# Patient Record
Sex: FEMALE | Race: BLACK OR AFRICAN AMERICAN | NOT HISPANIC OR LATINO | Employment: FULL TIME | URBAN - METROPOLITAN AREA
[De-identification: names, ages, dates, MRNs, and addresses within clinical notes are randomized per-mention and may not be internally consistent; named-entity substitution may affect disease eponyms.]

---

## 2024-02-21 ENCOUNTER — OFFICE VISIT (OUTPATIENT)
Dept: OBSTETRICS AND GYNECOLOGY | Facility: CLINIC | Age: 34
End: 2024-02-21
Payer: MEDICAID

## 2024-02-21 VITALS
WEIGHT: 157 LBS | SYSTOLIC BLOOD PRESSURE: 118 MMHG | BODY MASS INDEX: 27.82 KG/M2 | HEIGHT: 63 IN | DIASTOLIC BLOOD PRESSURE: 76 MMHG

## 2024-02-21 DIAGNOSIS — Z30.09 FAMILY PLANNING: ICD-10-CM

## 2024-02-21 DIAGNOSIS — O03.9 SPONTANEOUS MISCARRIAGE: Primary | ICD-10-CM

## 2024-02-21 DIAGNOSIS — N72 CERVICITIS AND ENDOCERVICITIS: ICD-10-CM

## 2024-02-21 DIAGNOSIS — N92.6 IRREGULAR BLEEDING: ICD-10-CM

## 2024-02-21 DIAGNOSIS — Z11.3 SCREEN FOR SEXUALLY TRANSMITTED DISEASES: ICD-10-CM

## 2024-02-21 DIAGNOSIS — N76.0 BACTERIAL VAGINOSIS: ICD-10-CM

## 2024-02-21 DIAGNOSIS — Z12.4 SCREENING FOR MALIGNANT NEOPLASM OF CERVIX: ICD-10-CM

## 2024-02-21 DIAGNOSIS — N89.8 VAGINAL DISCHARGE: ICD-10-CM

## 2024-02-21 DIAGNOSIS — B96.89 BACTERIAL VAGINOSIS: ICD-10-CM

## 2024-02-21 LAB
B-HCG UR QL: NEGATIVE
EXPIRATION DATE: NORMAL
INTERNAL NEGATIVE CONTROL: NORMAL
INTERNAL POSITIVE CONTROL: NORMAL
Lab: NORMAL

## 2024-02-21 RX ORDER — METRONIDAZOLE 500 MG/1
500 TABLET ORAL 2 TIMES DAILY
Qty: 14 TABLET | Refills: 0 | Status: SHIPPED | OUTPATIENT
Start: 2024-02-21 | End: 2024-02-28

## 2024-02-21 RX ORDER — VITAMIN A, VITAMIN C, VITAMIN D-3, VITAMIN E, VITAMIN B-1, VITAMIN B-2, NIACIN, VITAMIN B-6, CALCIUM, IRON, ZINC, COPPER 4000; 120; 400; 22; 1.84; 3; 20; 10; 1; 12; 200; 27; 25; 2 [IU]/1; MG/1; [IU]/1; MG/1; MG/1; MG/1; MG/1; MG/1; MG/1; UG/1; MG/1; MG/1; MG/1; MG/1
1 TABLET ORAL DAILY
COMMUNITY
Start: 2024-01-23 | End: 2024-02-21 | Stop reason: SDUPTHER

## 2024-02-21 RX ORDER — DOXYCYCLINE HYCLATE 100 MG/1
100 CAPSULE ORAL 2 TIMES DAILY
Qty: 28 CAPSULE | Refills: 0 | Status: SHIPPED | OUTPATIENT
Start: 2024-02-21 | End: 2024-03-06

## 2024-02-21 RX ORDER — VITAMIN A, VITAMIN C, VITAMIN D-3, VITAMIN E, VITAMIN B-1, VITAMIN B-2, NIACIN, VITAMIN B-6, CALCIUM, IRON, ZINC, COPPER 4000; 120; 400; 22; 1.84; 3; 20; 10; 1; 12; 200; 27; 25; 2 [IU]/1; MG/1; [IU]/1; MG/1; MG/1; MG/1; MG/1; MG/1; MG/1; UG/1; MG/1; MG/1; MG/1; MG/1
1 TABLET ORAL DAILY
Qty: 90 TABLET | Refills: 3 | Status: SHIPPED | OUTPATIENT
Start: 2024-02-21 | End: 2025-02-20

## 2024-02-21 NOTE — PROGRESS NOTES
Chief Complaint  Gynecologic Exam (Patient is here to consult miscarriage() still has dark spotting, would like std screening & would like to consult about future pregnancies )    Subjective        Anila Sepulveda presents to [facility]  History of Present Illness  Patient is here for follow-up after recent miscarriage.  She was seen at [facility] clinic in 2024 with findings of a spontaneous miscarriage.  She reports that she has had continued light bleeding off and on since that time.  She says the bleeding is bright red in color and scant in amount.  She also reports the presence of an occasionally white or yellowish discharge.  She reports lower abdominal cramping and discomfort.  She denies fevers and chills.  Patient does desire to conceive again in the near future.  She has a history of 3 prior vaginal deliveries at term without complication.  She has also had 3 elective pregnancy terminations.  Her recent miscarriage was her only spontaneous pregnancy loss.    Menstrual History:  OB History          7    Para        Term                AB   4    Living   3         SAB   1    IAB   3    Ectopic        Molar        Multiple        Live Births   3                 No LMP recorded (lmp unknown).     History reviewed. No pertinent past medical history.    History reviewed. No pertinent surgical history.    Social History     Tobacco Use    Smoking status: Never    Smokeless tobacco: Never   Vaping Use    Vaping Use: Never used   Substance Use Topics    Alcohol use: Yes     Comment: socially    Drug use: Never     Family History   Problem Relation Age of Onset    Breast cancer Neg Hx     Ovarian cancer Neg Hx     Uterine cancer Neg Hx     Colon cancer Neg Hx      Current Outpatient Medications on File Prior to Visit   Medication Sig    [DISCONTINUED] Prenatal Vit-Fe Fumarate-FA (Prenatal Vitamin Plus Low Iron) 27-1 MG tablet Take 1 tablet by mouth  "Daily. (Patient not taking: Reported on 2/21/2024)     No current facility-administered medications on file prior to visit.     No Known Allergies    Review of systems:  Constitutional: No fevers, chills, sweats   Eye: No recent visual problems, denies blurry vision   HEENT: No ear pain, nasal congestion, sore throat, voice changes   Respiratory: No shortness of breath, cough, pain on breathing   Cardiovascular: No Chest pain, palpitations   Gastrointestinal: No nausea, vomiting, diarrhea, constipation   Genitourinary: No hematuria, dysuria, lesions on genitalia   Hema/Lymph: Negative for bruising, no edema   Endocrine: Negative for excessive thirst, excessive hunger, heat or cold intolerance   Musculoskeletal: No joint pain, muscle pain, decreased range of motion   Integumentary: No rash, pruritus, abrasions, lesions   Neurologic: No weakness, numbness, headaches   Psychiatric: No anxiety, depression, mood changes       Objective   Vital Signs:  /76   Ht 160 cm (62.99\")   Wt 71.2 kg (157 lb)   BMI 27.82 kg/m²   Estimated body mass index is 27.82 kg/m² as calculated from the following:    Height as of this encounter: 160 cm (62.99\").    Weight as of this encounter: 71.2 kg (157 lb).             Physical Exam     Gen: No acute distress, awake and oriented times three  Abdomen: soft, nontender, no masses or hernia, no hepatosplenomegaly, non distended, normoactive bowel sounds  Pelvic: Exam performed in the presence of a female chaperone  Patient has provided verbal consent to proceed with exam.  Normal external female genitalia, no lesions  Urethra: Normal meatus, no caruncle  Bladder: nontender  Vagina: There is a moderate amount of blood-tinged/yellow discharge noted, no lesions  Cervix: Tender to palpation, mildly friable, cervix is closed and firm  Uterus: Anteverted, normal size and shape, nontender  Adnexa: No masses or tenderness  External anal exam: Normal appearance, no lesions or " hemorrhoids  Rectal: Deferred  Psych: Good judgement and insight, normal affect and mood    Result Review :    The following data was reviewed by: Yossi Torres MD on 2024:    Data reviewed : Radiologic studies Pelvic/OB US , Consultant notes [facility] notes, and Labs    Office Visit on 2024   Component Date Value Ref Range Status    HCG, Urine, QL 2024 Negative  Negative Final    Lot Number [id number]   Final    Internal Positive Control 2024 Passed  Positive, Passed Final    Internal Negative Control 2024 Passed  Negative, Passed Final    Expiration Date 2024   Final       24  Component  Ref Range & Units 4 wk ago Comments   HCG QUANTITATIVE  mIU/mL 108.6      24  Component  Ref Range & Units 1 mo ago Comments   hCG Quant  0 - 4 mInt Units/mL ,204 High       ABORh O POS     Component 1 mo ago   C. TRACHOMATIS DNA NOT DETECTED     Component 1 mo ago   NEISSERIA GONORRHOEAE DNA NOT DETECTED     US (24):  IMPRESSION:   Intrauterine gestation measures 7 weeks 1 days by crown-rump length.  Fetal heart   beat is not visualized.  Findings are compatible with an early pregnancy failure.     Left ovarian corpus luteal cyst measuring 4.0 x 4.1 x 2.7 cm.       MD visit 24:  Assessment, Management and Plan:  Problem List Items Addressed This Visit    Complete spontaneous  without complication - Primary  Overview  24:  - LMP 10/28/2023  - ED visit 2024 with bleeding for 2 days. BSUS showed GS, no definitive YS. Thickened endometrium.  - Quant 1204 on 24  - Patient report that since her ED visit, she has passed tissue. Showed pics during clinic visit today.  - Bleeding is now minimal with light cramping.  - PNVs sent to pharmacy since pt wishes to get pregnant again. Advised to wait for one menstrual cycle before trying again.  - UPT in clinic faintly positive. Quant ordered.  - Advised she take a home UPT in one week.  -  Return precautions discussed           Assessment and Plan     Diagnoses and all orders for this visit:    1. Spontaneous miscarriage (Primary)  -     POC Pregnancy, Urine  -     HIV-1 / O / 2 Ag / Antibody  -     US Non-ob Transvaginal; Future    Findings appear to be consistent with complete spontaneous miscarriage.  Will check quantitative hCG today.  Office pregnancy test here is negative today.  Obtain ultrasound to evaluate endometrial lining.    2. Irregular bleeding  -     US Non-ob Transvaginal; Future    Overall suspect this may be infectious in etiology.  There are some mild cervical tenderness as well as bloody/purulent discharge.  We will start empiric treatment with metronidazole and doxycycline.  Follow-up in 1 week.  Obtain ultrasound to evaluate endometrial lining.    3. Screening for malignant neoplasm of cervix  -     IGP, Apt HPV,rfx 16 / 18,45    4. Screen for sexually transmitted diseases  -     IGP, Apt HPV,rfx 16 / 18,45  -     NuSwab VG+ - Swab, Vagina  -     Hepatitis B Surface Antigen  -     HCV Antibody Rfx To Qnt PCR  -     RPR, Rfx Qn RPR / Confirm TP  -     HCG, B-subunit, Quantitative    5. Vaginal discharge  -     NuSwab VG+ - Swab, Vagina    6. Family planning  -     Prenatal Vit-Fe Fumarate-FA (Prenatal Vitamin Plus Low Iron) 27-1 MG tablet; Take 1 tablet by mouth Daily.  Dispense: 90 tablet; Refill: 3    Discussed overview of early first trimester Ab. US findings d/w pt. Discussed expectations with patient. Discussed implications for future pregnancies. Explained that - overall - spontaneous Ab is quite common affecting 20-25% of all recognized pregnancies, with chromosomal anomalies accounting for roughly 90% of these. Explained no proven intervention to prevent miscarriage in this scenario, and explained lack of evidence of benefit of progesterone supplementation in next pregnancy. Explained that given history, pt should not be of greater risk than the average 34 year old woman.  Would recommend routine prenatal care next visit. Explained there is no benefit to delaying pregnancy following a miscarriage. Would recommend pelvic rest until bleeding stops and hcg returns to 0. Discussed need to follow hcg back to 0.       7. Bacterial vaginosis  -     metroNIDAZOLE (FLAGYL) 500 MG tablet; Take 1 tablet by mouth 2 (Two) Times a Day for 7 days.  Dispense: 14 tablet; Refill: 0  -     doxycycline (VIBRAMYCIN) 100 MG capsule; Take 1 capsule by mouth 2 (Two) Times a Day for 14 days.  Dispense: 28 capsule; Refill: 0    8. Cervicitis and endocervicitis  -     metroNIDAZOLE (FLAGYL) 500 MG tablet; Take 1 tablet by mouth 2 (Two) Times a Day for 7 days.  Dispense: 14 tablet; Refill: 0  -     doxycycline (VIBRAMYCIN) 100 MG capsule; Take 1 capsule by mouth 2 (Two) Times a Day for 14 days.  Dispense: 28 capsule; Refill: 0    Given exam findings, suspect there may be an underlying cervical/possibly mild uterine infection.  We will treat empirically with metronidazole and doxycycline today.  Follow-up in 1 week.         Follow Up     Return GYn US 1-4 weeks, GYn FU after US.  Patient was given instructions and counseling regarding her condition or for health maintenance advice. Please see specific information pulled into the AVS if appropriate.

## 2024-02-22 LAB
HBV SURFACE AG SERPL QL IA: NEGATIVE
HCG INTACT+B SERPL-ACNC: 43 MIU/ML
HCV AB SERPL QL IA: NORMAL
HCV IGG SERPL QL IA: NON REACTIVE
HIV 1+2 AB+HIV1 P24 AG SERPL QL IA: NON REACTIVE
RPR SER QL: NON REACTIVE

## 2024-02-24 LAB
A VAGINAE DNA VAG QL NAA+PROBE: NORMAL SCORE
BVAB2 DNA VAG QL NAA+PROBE: NORMAL SCORE
C ALBICANS DNA VAG QL NAA+PROBE: NEGATIVE
C GLABRATA DNA VAG QL NAA+PROBE: NEGATIVE
C TRACH DNA VAG QL NAA+PROBE: NEGATIVE
CYTOLOGIST CVX/VAG CYTO: NORMAL
CYTOLOGY CVX/VAG DOC CYTO: NORMAL
CYTOLOGY CVX/VAG DOC THIN PREP: NORMAL
DX ICD CODE: NORMAL
HIV 1 & 2 AB SER-IMP: NORMAL
HPV I/H RISK 4 DNA CVX QL PROBE+SIG AMP: NEGATIVE
MEGA1 DNA VAG QL NAA+PROBE: NORMAL SCORE
N GONORRHOEA DNA VAG QL NAA+PROBE: NEGATIVE
OTHER STN SPEC: NORMAL
STAT OF ADQ CVX/VAG CYTO-IMP: NORMAL
T VAGINALIS DNA VAG QL NAA+PROBE: NEGATIVE

## 2024-02-28 ENCOUNTER — OFFICE VISIT (OUTPATIENT)
Dept: OBSTETRICS AND GYNECOLOGY | Facility: CLINIC | Age: 34
End: 2024-02-28
Payer: MEDICAID

## 2024-02-28 VITALS
SYSTOLIC BLOOD PRESSURE: 130 MMHG | WEIGHT: 159 LBS | HEIGHT: 63 IN | DIASTOLIC BLOOD PRESSURE: 78 MMHG | BODY MASS INDEX: 28.17 KG/M2

## 2024-02-28 DIAGNOSIS — O03.9 SPONTANEOUS MISCARRIAGE: ICD-10-CM

## 2024-02-28 DIAGNOSIS — N94.89 ENDOMETRIAL MASS: Primary | ICD-10-CM

## 2024-02-28 RX ORDER — SCOLOPAMINE TRANSDERMAL SYSTEM 1 MG/1
1 PATCH, EXTENDED RELEASE TRANSDERMAL CONTINUOUS
OUTPATIENT
Start: 2024-02-28 | End: 2024-03-02

## 2024-02-28 RX ORDER — SODIUM CHLORIDE 0.9 % (FLUSH) 0.9 %
10 SYRINGE (ML) INJECTION AS NEEDED
OUTPATIENT
Start: 2024-02-28

## 2024-02-28 RX ORDER — SODIUM CHLORIDE 0.9 % (FLUSH) 0.9 %
10 SYRINGE (ML) INJECTION EVERY 12 HOURS SCHEDULED
OUTPATIENT
Start: 2024-02-28

## 2024-02-28 RX ORDER — SODIUM CHLORIDE, SODIUM LACTATE, POTASSIUM CHLORIDE, CALCIUM CHLORIDE 600; 310; 30; 20 MG/100ML; MG/100ML; MG/100ML; MG/100ML
125 INJECTION, SOLUTION INTRAVENOUS CONTINUOUS
OUTPATIENT
Start: 2024-02-28

## 2024-02-28 RX ORDER — ACETAMINOPHEN 500 MG
1000 TABLET ORAL ONCE
OUTPATIENT
Start: 2024-02-28 | End: 2024-02-28

## 2024-02-28 RX ORDER — PRENATAL VIT,CAL 76/IRON/FOLIC 29 MG-1 MG
1 TABLET ORAL DAILY
COMMUNITY
Start: 2024-02-21 | End: 2024-02-28 | Stop reason: SDUPTHER

## 2024-02-28 NOTE — PROGRESS NOTES
"Chief Complaint  Gynecologic Exam (Patient is here for f/u to US performed yesterday )    Subjective        Anila Sepulveda presents to [facility]  History of Present Illness  Patient is here today for follow-up of ultrasound that was performed yesterday in order to evaluate irregular bleeding.  Patient recently had spontaneous miscarriage.  She is not having any bleeding at this time.  Her quantitative hCG last week was 43.    The following portions of the patient's history were reviewed and updated as appropriate: allergies, current medications, past family history, past medical history, past social history, past surgical history, and problem list.    Objective   Vital Signs:  /78   Ht 160 cm (62.99\")   Wt 72.1 kg (159 lb)   BMI 28.17 kg/m²   Estimated body mass index is 28.17 kg/m² as calculated from the following:    Height as of this encounter: 160 cm (62.99\").    Weight as of this encounter: 72.1 kg (159 lb).             Physical Exam   General: No acute distress, awake and oriented x3  Psychiatric: good judgment and insight, normal mood  Neurological: cranial nerves II through XII intact, no deficits    Result Review :            Office Visit on 02/21/2024   Component Date Value Ref Range Status    HCG, Urine, QL 02/21/2024 Negative  Negative Final    Lot Number 02/21/2024 [id number]   Final    Internal Positive Control 02/21/2024 Passed  Positive, Passed Final    Internal Negative Control 02/21/2024 Passed  Negative, Passed Final    Expiration Date 02/21/2024 03-   Final    Diagnosis 02/21/2024 Comment   Final    NEGATIVE FOR INTRAEPITHELIAL LESION OR MALIGNANCY.    Specimen adequacy: 02/21/2024 Comment   Final    Satisfactory for evaluation. No endocervical component is identified.    Clinician Provided ICD-10: 02/21/2024 Comment   Final    Comment: Z12.4  Z11.3      Performed by: 02/21/2024 Comment   Final    Sheela Bartlett, Cytotechnologist (ASCP)    . 02/21/2024 .   Final "    Note: 02/21/2024 Comment   Final    Comment: The Pap smear is a screening test designed to aid in the detection of  premalignant and malignant conditions of the uterine cervix.  It is not a  diagnostic procedure and should not be used as the sole means of detecting  cervical cancer.  Both false-positive and false-negative reports do occur.      Method: 02/21/2024 Comment   Final    Comment: This liquid based ThinPrep(R) pap test was screened with the  use of an image guided system.      HPV Aptima 02/21/2024 Negative  Negative Final    Comment: This nucleic acid amplification test detects fourteen high-risk  HPV types (16,18,31,33,35,39,45,51,52,56,58,59,66,68) without  differentiation.      Atopobium Vaginae 02/21/2024 Moderate - 1  Score Final    BVAB 2 02/21/2024 Low - 0  Score Final    Megasphaera 1 02/21/2024 Low - 0  Score Final    Comment: Calculate total score by adding the 3 individual bacterial  vaginosis (BV) marker scores together.  Total score is  interpreted as follows:  Total score 0-1: Indicates the absence of BV.  Total score   2: Indeterminate for BV. Additional clinical                   data should be evaluated to establish a                   diagnosis.  Total score 3-6: Indicates the presence of BV.  This test was developed and its performance characteristics  determined by [employer].  It has not been cleared or approved  by the Food and Drug Administration.      Fatoumata Albicans, SUNSHINE 02/21/2024 Negative  Negative Final    Fatoumata Glabrata, SUNSHINE 02/21/2024 Negative  Negative Final    Trichomonas vaginosis 02/21/2024 Negative  Negative Final    Chlamydia trachomatis, SUNSHINE 02/21/2024 Negative  Negative Final    Neisseria gonorrhoeae, SUNSHINE 02/21/2024 Negative  Negative Final    HIV Screen 4th Gen w/RFX (Referenc* 02/21/2024 Non Reactive  Non Reactive Final    Comment: HIV Negative  HIV-1/HIV-2 antibodies and HIV-1 p24 antigen were NOT detected.  There is no laboratory evidence of HIV infection.       Hepatitis B Surface Ag 02/21/2024 Negative  Negative Final    Hepatitis C Ab 02/21/2024 Non Reactive  Non Reactive Final    RPR 02/21/2024 Non Reactive  Non Reactive Final    HCG Quantitative 02/21/2024 43  mIU/mL Final    Comment:                      Female (Non-pregnant)    0 -     5                              (Postmenopausal)  0 -     8                       Female (Pregnant)                       Weeks of Gestation                               3                6 -    71                               4               10 -   750                               5              797 -  5238                               6              939 - 75198                               7             7130 -[id number]                               8            [id number] -862136                               9            [id number] -[id number]                              10            [id number] -[id number]                              12            76438 -076605                              14            38155 - 42639                              15            23119 - 90415                              16             3587 - 32185                              17             7265 - 74991                              18             6470 - 40570  Roche E                           CLIA methodology      Interpretation 02/21/2024 Comment   Final    Comment: Not infected with HCV unless early or acute infection is  suspected (which may be delayed in an immunocompromised  individual), or other evidence exists to indicate HCV infection.           Ultrasound (2/27/24):  Findings:  Uterus measures 9.38 x 6.70 x 5.59 cm, volume 183.945 cm cube  There are no intramural or subserosal fibroids or masses identified.  Endometrial thickness measures 1.46 cm.  There is a vascular endometrial mass measuring 2.23 x 1.24 cm which likely represents an endometrial polyp.  Cervix is normal-appearing.    Left ovary: 2.42 x 1.98 x 2.19 cm, volume 5.494 cm cube  There is no adnexal  mass or cyst identified.    Right ovary: 2.75 x 1.83 x 2.55 cm, volume 6.719 cm cube  There is no adnexal mass or cyst identified.    There is no free fluid.    Impression:    2.2 cm endometrial mass which is likely consistent with a polyp.  Otherwise normal pelvic ultrasound         Assessment and Plan     Diagnoses and all orders for this visit:    1. Endometrial mass (Primary)  -     Case Request; Standing  -     Instruct Patient on Coughing, Deep Breathing and Incentive Spirometry; Future  -     sodium chloride 0.9 % flush 10 mL  -     sodium chloride 0.9 % flush 10 mL  -     lactated ringers infusion  -     acetaminophen (TYLENOL) tablet 1,000 mg  -     scopolamine patch 1 mg/72 hr  -     ceFAZolin (ANCEF) 2,000 mg in sodium chloride 0.9 % 100 mL IVPB  -     Case Request    2. Spontaneous miscarriage  -     HCG, B-subunit, Quantitative    Other orders  -     Code Status and Medical Interventions:; Standing  -     Follow Anesthesia Guidelines / Protocol; Future  -     Follow Anesthesia Guidelines / Protocol; Standing  -     Chlorhexidine Skin Prep; Future  -     Provide Patient With ERAS Hydration Instructions  -     Provide Patient With ERAS Booklet(s)/Handout  -     Place Sequential Compression Device; Standing  -     Prepare and Witness Surgical Consent Form; Standing  -     Verify NPO Status; Standing  -     Pregnancy, Urine - Urine, Clean Catch; Standing  -     Insert Peripheral IV; Standing  -     Saline Lock & Maintain IV Access; Standing    Recent lab and ultrasound results discussed with the patient.  We discussed significance of the ultrasound findings that appear to be suspicious for an endometrial polyp versus some other type of endometrial mass.  I would recommend hysteroscopy, dilation curettage, and MyoSure excision/polypectomy for both diagnostic and therapeutic purposes.  We discussed the differential diagnosis of the mass including most likely being a polyp versus fibroid, blood clot,  malignancy.  Explained Saúl would allow for diagnostic evaluation.  Additionally, this is likely the cause of her irregular bleeding and potentially may lead to complications in future pregnancies.  As the patient does desire to conceive in the near future, I would recommend removal of this mass.    The risk, benefits, alternatives to the surgery were discussed with the patient at length.  Surgical risk including the risk of bleeding, infection, scarring, weakening of the cervix, possibility of  labor/delivery, intrauterine adhesions, subfertility, infertility, uterine perforation, inadvertent injury to GI/ structures, anesthesia complications, etc. discussed with the patient at length.  All questions answered and she wishes to proceed with surgery.         Follow Up     Return after surgery.  Patient was given instructions and counseling regarding her condition or for health maintenance advice. Please see specific information pulled into the AVS if appropriate.

## 2024-02-29 LAB — HCG INTACT+B SERPL-ACNC: 21 MIU/ML

## 2024-03-08 ENCOUNTER — TELEPHONE (OUTPATIENT)
Dept: OBSTETRICS AND GYNECOLOGY | Facility: CLINIC | Age: 34
End: 2024-03-08
Payer: MEDICAID

## 2024-03-08 NOTE — TELEPHONE ENCOUNTER
Spoke with pt via  she is aware of procedure details. Provided pt with address to [place].     Mela Olivera

## 2024-03-12 NOTE — DISCHARGE INSTRUCTIONS
PAT call complete. Education provided to the patient on the following:    - Nothing to eat after midnight the night before your procedure, water and black coffee okay up to 2 hours before arrival time.  - If diabetic and procedure is after noon: No food 8 hours prior to arrival time, and only then only clear liquids 2 hours before arrival time.   - You will need to have someone drive you home after your procedure and remain with you for 24 hours after. The  will need to remain on site during your visit.  - Please remove all jewelry, including body piercing's, and leave any valuables at home. Only bring your drivers license and insurance card on day of procedure.  - Please arrive with a full bladder to provide a pregnancy test.   - Do not wear contact lenses; wear glasses and bring your case.  - Do not use any tobacco products on morning of procedure.  - Wash with antibacterial soap (such as Dial) the night before and morning of procedure.  - Be prepared to provide your last dose of all home medications.  - Coffee and vending available on the 1st and 5th floors; no cafeteria on site.  - You will need to arrive at 10:00am on Monday 3/18/24 at [facility] located at [address]. We are located on the 5th floor.  -Please be aware that arrival times may be subject to change up until the day of surgery.   - Feel free to contact us at: [phone] with any additional questions/concerns.  - Hold prenatal vitamin starting now.  - Do not have sex prior to your surgery and not until cleared by surgeon post surgery.

## 2024-03-12 NOTE — PRE-PROCEDURE INSTRUCTIONS
PAT call complete with  Natalia #[id number].   Education provided to the patient on the following:     - Nothing to eat after midnight the night before your procedure, water and black coffee okay up to 2 hours before arrival time.  - If diabetic and procedure is after noon: No food 8 hours prior to arrival time, and only then only clear liquids 2 hours before arrival time.   - You will need to have someone drive you home after your procedure and remain with you for 24 hours after. The  will need to remain on site during your visit.  - Please remove all jewelry, including body piercing's, and leave any valuables at home. Only bring your drivers license and insurance card on day of procedure.  - Please arrive with a full bladder to provide a pregnancy test.   - Do not wear contact lenses; wear glasses and bring your case.  - Do not use any tobacco products on morning of procedure.  - Wash with antibacterial soap (such as Dial) the night before and morning of procedure.  - Be prepared to provide your last dose of all home medications.  - Coffee and vending available on the 1st and 5th floors; no cafeteria on site.  - You will need to arrive at 10:00am on Monday 3/18/24 at [facility] located at [address]. We are located on the 5th floor.  -Please be aware that arrival times may be subject to change up until the day of surgery.   - Feel free to contact us at: [phone] with any additional questions/concerns.  - Hold prenatal vitamin starting now.  - Do not have sex prior to your surgery and not until cleared by surgeon post surgery.

## 2024-03-18 ENCOUNTER — HOSPITAL ENCOUNTER (OUTPATIENT)
Age: 34
Setting detail: HOSPITAL OUTPATIENT SURGERY
Discharge: HOME | End: 2024-03-18
Attending: OBSTETRICS & GYNECOLOGY | Admitting: OBSTETRICS & GYNECOLOGY
Payer: MEDICAID

## 2024-03-18 ENCOUNTER — ANESTHESIA EVENT (OUTPATIENT)
Age: 34
End: 2024-03-18
Payer: MEDICAID

## 2024-03-18 ENCOUNTER — ANESTHESIA (OUTPATIENT)
Age: 34
End: 2024-03-18
Payer: MEDICAID

## 2024-03-18 VITALS
OXYGEN SATURATION: 100 % | HEART RATE: 68 BPM | HEIGHT: 68 IN | DIASTOLIC BLOOD PRESSURE: 91 MMHG | RESPIRATION RATE: 16 BRPM | WEIGHT: 161.6 LBS | BODY MASS INDEX: 24.49 KG/M2 | TEMPERATURE: 98 F | SYSTOLIC BLOOD PRESSURE: 120 MMHG

## 2024-03-18 DIAGNOSIS — N94.89 ENDOMETRIAL MASS: ICD-10-CM

## 2024-03-18 LAB
B-HCG UR QL: POSITIVE
EXPIRATION DATE: ABNORMAL
INTERNAL NEGATIVE CONTROL: NEGATIVE
INTERNAL POSITIVE CONTROL: POSITIVE
Lab: ABNORMAL

## 2024-03-18 PROCEDURE — 81025 URINE PREGNANCY TEST: CPT | Performed by: STUDENT IN AN ORGANIZED HEALTH CARE EDUCATION/TRAINING PROGRAM

## 2024-03-18 RX ORDER — FAMOTIDINE 10 MG/ML
20 INJECTION, SOLUTION INTRAVENOUS ONCE
Status: DISCONTINUED | OUTPATIENT
Start: 2024-03-18 | End: 2024-03-18 | Stop reason: HOSPADM

## 2024-03-18 RX ORDER — SCOLOPAMINE TRANSDERMAL SYSTEM 1 MG/1
1 PATCH, EXTENDED RELEASE TRANSDERMAL CONTINUOUS
Status: DISCONTINUED | OUTPATIENT
Start: 2024-03-18 | End: 2024-03-18 | Stop reason: HOSPADM

## 2024-03-18 RX ORDER — ACETAMINOPHEN 500 MG
1000 TABLET ORAL ONCE
Status: DISCONTINUED | OUTPATIENT
Start: 2024-03-18 | End: 2024-03-18 | Stop reason: HOSPADM

## 2024-03-18 RX ORDER — SODIUM CHLORIDE, SODIUM LACTATE, POTASSIUM CHLORIDE, CALCIUM CHLORIDE 600; 310; 30; 20 MG/100ML; MG/100ML; MG/100ML; MG/100ML
125 INJECTION, SOLUTION INTRAVENOUS CONTINUOUS
Status: DISCONTINUED | OUTPATIENT
Start: 2024-03-18 | End: 2024-03-18 | Stop reason: HOSPADM

## 2024-03-18 RX ORDER — SODIUM CHLORIDE 0.9 % (FLUSH) 0.9 %
10 SYRINGE (ML) INJECTION EVERY 12 HOURS SCHEDULED
Status: DISCONTINUED | OUTPATIENT
Start: 2024-03-18 | End: 2024-03-18 | Stop reason: HOSPADM

## 2024-03-18 RX ORDER — SODIUM CHLORIDE 0.9 % (FLUSH) 0.9 %
10 SYRINGE (ML) INJECTION AS NEEDED
Status: DISCONTINUED | OUTPATIENT
Start: 2024-03-18 | End: 2024-03-18 | Stop reason: HOSPADM

## 2024-03-18 RX ORDER — MIDAZOLAM HYDROCHLORIDE 1 MG/ML
1 INJECTION INTRAMUSCULAR; INTRAVENOUS
Status: DISCONTINUED | OUTPATIENT
Start: 2024-03-18 | End: 2024-03-18 | Stop reason: HOSPADM

## 2024-03-18 RX ORDER — SODIUM CHLORIDE 0.9 % (FLUSH) 0.9 %
3-10 SYRINGE (ML) INJECTION AS NEEDED
Status: DISCONTINUED | OUTPATIENT
Start: 2024-03-18 | End: 2024-03-18 | Stop reason: HOSPADM

## 2024-03-18 RX ORDER — SODIUM CHLORIDE 0.9 % (FLUSH) 0.9 %
3 SYRINGE (ML) INJECTION EVERY 12 HOURS SCHEDULED
Status: DISCONTINUED | OUTPATIENT
Start: 2024-03-18 | End: 2024-03-18 | Stop reason: HOSPADM

## 2024-03-18 RX ORDER — FENTANYL CITRATE 50 UG/ML
50 INJECTION, SOLUTION INTRAMUSCULAR; INTRAVENOUS ONCE AS NEEDED
Status: DISCONTINUED | OUTPATIENT
Start: 2024-03-18 | End: 2024-03-18 | Stop reason: HOSPADM

## 2024-03-18 RX ORDER — SODIUM CHLORIDE, SODIUM LACTATE, POTASSIUM CHLORIDE, CALCIUM CHLORIDE 600; 310; 30; 20 MG/100ML; MG/100ML; MG/100ML; MG/100ML
9 INJECTION, SOLUTION INTRAVENOUS CONTINUOUS
Status: DISCONTINUED | OUTPATIENT
Start: 2024-03-18 | End: 2024-03-18 | Stop reason: HOSPADM

## 2024-03-18 NOTE — ANESTHESIA PREPROCEDURE EVALUATION
Anesthesia Evaluation     Patient summary reviewed and Nursing notes reviewed                Airway   Mallampati: II  TM distance: >3 FB  Neck ROM: full  Dental - normal exam     Pulmonary - negative pulmonary ROS   Cardiovascular - negative cardio ROS        Neuro/Psych- negative ROS  GI/Hepatic/Renal/Endo - negative ROS     Musculoskeletal     Abdominal    Substance History      OB/GYN          Other        ROS/Med Hx Other: 33 y.o. female status post spontaneous pregnancy loss with endometrial mass                    Anesthesia Plan    ASA 2     general     (I have reviewed the patient's history with the patient and the chart, including all pertinent laboratory results and imaging. I have explained the risks of anesthesia including but not limited to dental damage, corneal abrasion, nerve injury, MI, stroke, and death. Questions asked and answered. Anesthetic plan discussed with patient and team as indicated. Patient expressed understanding of the above.    Clear liquids at 0900)  intravenous induction     Anesthetic plan, risks, benefits, and alternatives have been provided, discussed and informed consent has been obtained with: patient.        CODE STATUS:

## 2024-03-18 NOTE — PROGRESS NOTES
"Chief Complaint  Gynecologic Exam (Patient is here to consult about US)    Subjective        Anila Sepulveda presents to [facility]  History of Present Illness  Patient is here for repeat evaluation of endometrial mass and slightly elevated hCG levels.  Patient had spontaneous miscarriage in January 2024.  hCG levels have declined to a shawn of 21 on 2/28; however, earlier this week just prior to her planned hysteroscopy hCG level had slightly increased on 3/15 to 23.  Patient had opted at that time to postpone the surgery in hopes of further workup.  As noted in my note at the hospital at the time, the patient has not been sexually active since November, and as such cannot have a new pregnancy.  She underwent ultrasound 2 days ago.  Findings are included below.  Repeat hCG on 3/18 was again 23.    Objective   Vital Signs:  /68   Ht 172.7 cm (67.99\")   Wt 72.9 kg (160 lb 12.8 oz)   BMI 24.46 kg/m²   Estimated body mass index is 24.46 kg/m² as calculated from the following:    Height as of this encounter: 172.7 cm (67.99\").    Weight as of this encounter: 72.9 kg (160 lb 12.8 oz).       BMI is within normal parameters. No other follow-up for BMI required.      Physical Exam   General: No acute distress, awake and oriented x3  Psychiatric: good judgment and insight, normal mood  Neurological: cranial nerves II through XII intact, no deficits    Result Review :    The following data was reviewed by: Yossi Torres MD on 03/20/2024:    Data reviewed : Radiologic studies US and Labs    Component  Ref Range & Units 2 d ago 5 d ago 3 wk ago 4 wk ago   HCG Quantitative  mIU/mL 23 23.70 CM 21 CM 43 CM       Ultrasound (3/18/24):  Findings:  Uterus measures 8.36 x 5.71 x 4.43 cm, volume 110.725 cm cube  There are no intramural or subserosal fibroids or masses identified.  Endometrial thickness measures 2.25 cm.  There is again noted to be an intrauterine endometrial mass which overall " appears similar to the recent ultrasound on 2/27.  The largest measurement today is 2.8 x 2.4 cm, which is slightly increased in size from the previous ultrasound; however, this could be related to interobserver error.  There is noted to be vascular flow to the periphery of the mass, similar in appearance to the previous ultrasound.  Cervix is normal-appearing.     Left ovary: 2.53 x 1.77 x 1.77 cm, volume 4.150 cm cube  There is no adnexal mass or cyst identified.     Right ovary: 2.24 x 1.10 x 1.14 cm, volume 1.471 cm cube  There is no adnexal mass or cyst identified.     There is no free fluid.     Impression:     There is again noted to be an intrauterine endometrial mass, today measuring 2.8 x 2.4 cm.  Differential diagnosis would include endometrial polyp, submucosal fibroid, retained products of conception, gestational trophoblastic disease/partial molar pregnancy, uterine malignancy.  No intra or extrauterine gestation is identified.  Otherwise normal ultrasound.         Assessment and Plan     Diagnoses and all orders for this visit:    1. Elevated serum hCG in female, not pregnant (Primary)  -     Case Request; Standing  -     Instruct Patient on Coughing, Deep Breathing and Incentive Spirometry; Future  -     sodium chloride 0.9 % flush 10 mL  -     sodium chloride 0.9 % flush 10 mL  -     lactated ringers infusion  -     acetaminophen (TYLENOL) tablet 1,000 mg  -     scopolamine patch 1 mg/72 hr  -     ceFAZolin (ANCEF) 2,000 mg in sodium chloride 0.9 % 100 mL IVPB  -     Case Request    2. Endometrial mass  -     Case Request; Standing  -     Instruct Patient on Coughing, Deep Breathing and Incentive Spirometry; Future  -     sodium chloride 0.9 % flush 10 mL  -     sodium chloride 0.9 % flush 10 mL  -     lactated ringers infusion  -     acetaminophen (TYLENOL) tablet 1,000 mg  -     scopolamine patch 1 mg/72 hr  -     ceFAZolin (ANCEF) 2,000 mg in sodium chloride 0.9 % 100 mL IVPB  -     Case  Request    Other orders  -     Code Status and Medical Interventions:; Standing  -     Follow Anesthesia Guidelines / Protocol; Future  -     Follow Anesthesia Guidelines / Protocol; Standing  -     Provide Patient With ERAS Hydration Instructions  -     Provide Patient With ERAS Booklet(s)/Handout  -     Obtain Informed Consent; Standing  -     Verify NPO Status; Standing  -     Place Sequential Compression Device; Standing  -     Verify / Perform Chlorhexidine Skin Prep; Standing  -     Verify NPO Status; Standing  -     Pregnancy, Urine - Urine, Clean Catch; Standing  -     Insert Peripheral IV; Standing  -     Saline Lock & Maintain IV Access; Standing    Patient with plateauing hCG levels following a recent miscarriage.  She also has ultrasound that shows endometrial mass measuring upwards of 2.8 cm.  I have discussed the situation with the patient at length.  I explained the differential diagnosis of the mass to include retained products of conception, gestational trophoblastic disease/partial mole, uterine malignancy, endometrial polyp, submucosal fibroid, etc.  The fact that the patient still having persistent hCG elevations does raise the concern of retained products of conception or, less likely, hCG releasing malignancy.  As such, I feel it is imperative that we obtain tissue diagnosis from this endometrial mass.  Again, the best way to do this would be with hysteroscopy, dilation curettage, and likely MyoSure.    I have explained in no uncertain terms that at this time there is no evidence of a viable pregnancy.  Patient had a miscarriage in January.  At that time there was a documented 7-week fetus with no fetal heart tones.  This is clearly no longer seen.  Her hCG had fallen from about 1200 then now to a shawn of 21.  hCG did not increase at all any from 3/15 to 3/18, thus we can be confident that there is not a new viable pregnancy.  Furthermore, the patient notes that she has not been sexually  active at all since 11/2023.    At this time I recommend resuming plans for hysteroscopy, dilation curettage, excision of endometrial mass.  Risk, benefits, alternatives to the surgery were again discussed with the patient.  Preoperative process and typical recovery were discussed.  All questions answered and she agrees with plan to proceed with surgery at this time.    Surgery scheduled for 3/28 at 3:30 PM         Follow Up     No follow-ups on file.  Patient was given instructions and counseling regarding her condition or for health maintenance advice. Please see specific information pulled into the AVS if appropriate.

## 2024-03-18 NOTE — PROGRESS NOTES
Entirety of today's encounter including patient interview, exam, and counseling performed with the aid of a medical [facility]  via the telephone.       As noted in my H&P, patient had plateauing hCG level.  Patient reports that she has not had sex at all since November 2023.  Her partner has been abroad and she has not been sexually active at all.    In that interim, she had a miscarriage in January 2024.  Her hCG level that we can document was as high as 1200.  Gradually had dropped down to a shawn of 21 on 2/28/24.  In preparation for her surgery today, she had labs done last week on 3/15, which showed an hCG of 23.    Had the patient been sexually active within the last 2-3 weeks, there is at least the remote possibility that she may have had complete resolution of her hCG level followed by a new ovulation, followed by conception; however, the patient states that she has not been sexually active at all for at least the last 4 months.  I have explained to the patient that it would not be medically possible for her to have a new pregnancy in the absence of sexual activity.  I explained my concerns that her plateauing hCG may be related to either retained products of conception or, gestational trophoblastic disease such as a partial mole, or less likely, a malignancy releasing hCG.  I explained that the only way to definitively diagnose this would be with surgical evacuation of the uterine contents and laboratory/genetic evaluation.    The patient states that she and her family have been praying for miracle and that the uterine mass seen on ultrasound last month could be developing into a new pregnancy.  I explained that physiologically there would be no basis for this.    Given the patient's concerns and believes, I do think it would be reasonable to repeat blood work at this time, repeat the ultrasound at this time, and monitor the situation closely.  Should she have rising hCG levels, we would need to  follow them serially very closely until they either reach the discriminatory zone where we would expect to see an intrauterine pregnancy or if they plateau off again or decrease, proceeding with surgery at that time.    I did explain that the practice of close observation with serial labs and ultrasound may delay the diagnosis and treatment of a potentially serious condition such as a cancer.  The patient verbalizes understanding, but she wishes to cancel surgery today.    We will have her go to the office today for lab work and ultrasound.  I will see her in the office in 2 days for review of these results and for further planning.    I also explained all of the above to the patient's friend who has accompanied her today.  They have no further questions.

## 2024-03-18 NOTE — H&P
[facility]   HISTORY AND PHYSICAL    Patient Name: Anila Sepulveda  : 1990  MRN: [id number]  Primary Care Physician:  Frannie Vanegas APRN  Date of admission: (Not on file)    Subjective   Subjective     Chief Complaint: Surgery    HPI:    Anila Sepulveda is a 33 y.o. female patient presents today for scheduled surgical hysteroscopy, dilation curettage, and excision of uterine mass/polyp.  Patient recently had a spontaneous miscarriage.  Upon workup for this with ultrasound was noted to have a intrauterine mass which likely represent an endometrial polyp but with broad differential.  They recommended removal of the mass for both diagnostic and therapeutic purposes.  She does report a history of irregular menses.    She is in her usual state of health at this time.    Review of Systems     Constitutional: No fevers, chills, sweats   Eye: No recent visual problems, denies blurry vision   HEENT: No ear pain, nasal congestion, sore throat, voice changes   Respiratory: No shortness of breath, cough, pain on breathing   Cardiovascular: No Chest pain, palpitations   Gastrointestinal: No nausea, vomiting, diarrhea, constipation   Genitourinary: No hematuria, dysuria, lesions on genitalia   Hema/Lymph: Negative for bruising, no edema   Endocrine: Negative for excessive thirst, excessive hunger, heat or cold intolerance   Musculoskeletal: No joint pain, muscle pain, decreased range of motion   Integumentary: No rash, pruritus, abrasions, lesions   Neurologic: No weakness, numbness, headaches   Psychiatric: No anxiety, depression, mood changes         Personal History     Past Medical History:   Diagnosis Date    Irregular uterine bleeding     Spontaneous miscarriage 2024       History reviewed. No pertinent surgical history.    Family History: family history is not on file. Otherwise pertinent FHx was reviewed and not pertinent to current issue.    Social History:  reports that she has never smoked. She  has never used smokeless tobacco. She reports current alcohol use. She reports that she does not use drugs.    Home Medications:  Prenatal Vitamin Plus Low Iron      Allergies:  No Known Allergies    Objective   Objective     Vitals:      Physical Exam     General: No acute distress, awake and oriented x3   HEENT: Normocephalic atraumatic, moist mucous membranes   Eyes: Pupils equal round reactive to light and accommodation, extraocular muscles intact   Respiratory: Normal work of breathing, good air movement   Extremities: No calf tenderness, no lower extremity edema   Psychiatric: Good judgment and insight, normal affect and mood   Neurologic: Cranial nerves II through XII intact, no deficits   Skin: No rashes or lesions       Result Review    Result Review:  I have personally reviewed the results from the time of this admission to 3/18/2024 08:59 EDT and agree with these findings:  [x]  Laboratory list / accordion  []  Microbiology  [x]  Radiology  []  EKG/Telemetry   []  Cardiology/Vascular   []  Pathology  []  Old records  []  Other:  Most notable findings include:           Component  Ref Range & Units 3 d ago 2 wk ago 3 wk ago   HCG Quantitative  mIU/mL 23.70 21 CM 43 CM        1/23/24  Component  Ref Range & Units 4 wk ago Comments   HCG QUANTITATIVE  mIU/mL 108.6        1/7/24  Component  Ref Range & Units 1 mo ago Comments   hCG Quant  0 - 4 mInt Units/mL 1,204 High         ABORh O POS       Ultrasound (2/27/24):  Findings:  Uterus measures 9.38 x 6.70 x 5.59 cm, volume 183.945 cm cube  There are no intramural or subserosal fibroids or masses identified.  Endometrial thickness measures 1.46 cm.  There is a vascular endometrial mass measuring 2.23 x 1.24 cm which likely represents an endometrial polyp.  Cervix is normal-appearing.     Left ovary: 2.42 x 1.98 x 2.19 cm, volume 5.494 cm cube  There is no adnexal mass or cyst identified.     Right ovary: 2.75 x 1.83 x 2.55 cm, volume 6.719 cm cube  There is no  adnexal mass or cyst identified.     There is no free fluid.     Impression:     2.2 cm endometrial mass which is likely consistent with a polyp.  Otherwise normal pelvic ultrasound      Assessment & Plan   Assessment / Plan     Brief Patient Summary:  Anila Sepulveda is a 33 y.o. female status post spontaneous pregnancy loss with endometrial mass    Active Hospital Problems:  Active Hospital Problems    Diagnosis     **Endometrial mass      Plan:   1.  Patient has had recent spontaneous miscarriage.  Now has plateauing hCG levels around 20.  Recent imaging suggested a roughly 2 cm intrauterine mass of unclear origin.  Differential diagnosis would include retained products of conception, clot, polyp, fibroid, partial mole, malignancy, etc. There is clearly no viable pregnancy giving falling hcg levels.   We discussed significance of the ultrasound findings that appear to be suspicious for an endometrial polyp versus some other type of endometrial mass.  I would recommend hysteroscopy, dilation curettage, and MyoSure excision/polypectomy for both diagnostic and therapeutic purposes.   Explained Myosure would allow for diagnostic evaluation.  Additionally, this is likely the cause of her irregular bleeding and potentially may lead to complications in future pregnancies.  As the patient does desire to conceive in the near future, I would recommend removal of this mass.   The risk, benefits, alternatives to the surgery were discussed with the patient at length.  Surgical risk including the risk of bleeding, infection, scarring, weakening of the cervix, possibility of  labor/delivery, intrauterine adhesions, subfertility, infertility, uterine perforation, inadvertent injury to GI/ structures, anesthesia complications, etc. discussed with the patient at length.  All questions answered and she wishes to proceed with surgery.  2. Routine preop antibiotics with cefazolin 2 grams.    DVT prophylaxis:  SCDs      CODE  STATUS:  Full code     Admission Status:  I believe this patient meets outpatient status.    Yossi Torres MD

## 2024-03-20 ENCOUNTER — OFFICE VISIT (OUTPATIENT)
Dept: OBSTETRICS AND GYNECOLOGY | Facility: CLINIC | Age: 34
End: 2024-03-20
Payer: MEDICAID

## 2024-03-20 VITALS
SYSTOLIC BLOOD PRESSURE: 102 MMHG | WEIGHT: 160.8 LBS | HEIGHT: 68 IN | BODY MASS INDEX: 24.37 KG/M2 | DIASTOLIC BLOOD PRESSURE: 68 MMHG

## 2024-03-20 DIAGNOSIS — N94.89 ENDOMETRIAL MASS: ICD-10-CM

## 2024-03-20 DIAGNOSIS — R79.89 ELEVATED SERUM HCG IN FEMALE, NOT PREGNANT: Primary | ICD-10-CM

## 2024-03-20 RX ORDER — SODIUM CHLORIDE 0.9 % (FLUSH) 0.9 %
10 SYRINGE (ML) INJECTION EVERY 12 HOURS SCHEDULED
OUTPATIENT
Start: 2024-03-20

## 2024-03-20 RX ORDER — SODIUM CHLORIDE, SODIUM LACTATE, POTASSIUM CHLORIDE, CALCIUM CHLORIDE 600; 310; 30; 20 MG/100ML; MG/100ML; MG/100ML; MG/100ML
125 INJECTION, SOLUTION INTRAVENOUS CONTINUOUS
OUTPATIENT
Start: 2024-03-20

## 2024-03-20 RX ORDER — SCOLOPAMINE TRANSDERMAL SYSTEM 1 MG/1
1 PATCH, EXTENDED RELEASE TRANSDERMAL CONTINUOUS
OUTPATIENT
Start: 2024-03-20 | End: 2024-03-23

## 2024-03-20 RX ORDER — ACETAMINOPHEN 500 MG
1000 TABLET ORAL ONCE
OUTPATIENT
Start: 2024-03-20 | End: 2024-03-20

## 2024-03-20 RX ORDER — SODIUM CHLORIDE 0.9 % (FLUSH) 0.9 %
10 SYRINGE (ML) INJECTION AS NEEDED
OUTPATIENT
Start: 2024-03-20

## 2024-03-22 NOTE — PRE-PROCEDURE INSTRUCTIONS
PAT call complete. Education provided to the patient on the following: Matthew  # [id number]     - Nothing to eat after midnight the night before your procedure, water and black coffee okay up to 2 hours before arrival time.  - If diabetic and procedure is after noon: No food 8 hours prior to arrival time, and only then only clear liquids 2 hours before arrival time.   - You will need to have someone drive you home after your procedure and remain with you for 24 hours after. The  will need to remain on site during your visit.  - Please remove all jewelry, including body piercing's, and leave any valuables at home. Only bring your drivers license and insurance card on day of procedure.  - Please arrive with a full bladder to provide a pregnancy test.   - Do not wear contact lenses; wear glasses and bring your case.  - Wash with antibacterial soap (such as Dial) the night before and morning of procedure.  - Be prepared to provide your last dose of all home medications.  - Coffee and vending available on the 1st and 5th floors; no cafeteria on site.  - You will need to arrive at 3/28/24 on 1430 at [facility] located at [address]. We are located on the 5th floor.  -Please be aware that arrival times may be subject to change up until the day of surgery.   - Feel free to contact us at: [phone] with any additional questions/concerns.     Instructed to hold pre-keyla vitamin. Verbalized understanding. Patient states her friends Kimberly and Deana will bring her and take her home.

## 2024-03-25 ENCOUNTER — TELEPHONE (OUTPATIENT)
Dept: OBSTETRICS AND GYNECOLOGY | Facility: CLINIC | Age: 34
End: 2024-03-25
Payer: MEDICAID

## 2024-03-25 NOTE — TELEPHONE ENCOUNTER
Spoke with pt via  she is aware of new arrival time of 12:30 pm for her surgery that was moved up to 1:30 pm on 3-28.     Mela Olivera

## 2024-03-27 ENCOUNTER — ANESTHESIA EVENT (OUTPATIENT)
Age: 34
End: 2024-03-27
Payer: MEDICAID

## 2024-03-28 ENCOUNTER — HOSPITAL ENCOUNTER (OUTPATIENT)
Age: 34
Setting detail: HOSPITAL OUTPATIENT SURGERY
Discharge: HOME | End: 2024-03-28
Attending: OBSTETRICS & GYNECOLOGY | Admitting: OBSTETRICS & GYNECOLOGY
Payer: MEDICAID

## 2024-03-28 ENCOUNTER — ANESTHESIA (OUTPATIENT)
Age: 34
End: 2024-03-28
Payer: MEDICAID

## 2024-03-28 VITALS
HEIGHT: 68 IN | BODY MASS INDEX: 25.01 KG/M2 | TEMPERATURE: 97.1 F | WEIGHT: 165 LBS | SYSTOLIC BLOOD PRESSURE: 148 MMHG | DIASTOLIC BLOOD PRESSURE: 95 MMHG | RESPIRATION RATE: 12 BRPM | OXYGEN SATURATION: 100 % | HEART RATE: 76 BPM

## 2024-03-28 DIAGNOSIS — N94.89 ENDOMETRIAL MASS: ICD-10-CM

## 2024-03-28 DIAGNOSIS — G89.18 POSTOPERATIVE PAIN: Primary | ICD-10-CM

## 2024-03-28 DIAGNOSIS — R79.89 ELEVATED SERUM HCG IN FEMALE, NOT PREGNANT: ICD-10-CM

## 2024-03-28 LAB
B-HCG UR QL: NEGATIVE
EXPIRATION DATE: NORMAL
INTERNAL NEGATIVE CONTROL: NEGATIVE
INTERNAL POSITIVE CONTROL: POSITIVE
Lab: NORMAL

## 2024-03-28 PROCEDURE — 88305 TISSUE EXAM BY PATHOLOGIST: CPT | Performed by: OBSTETRICS & GYNECOLOGY

## 2024-03-28 PROCEDURE — 25010000002 LIDOCAINE 1 % SOLUTION: Performed by: ANESTHESIOLOGY

## 2024-03-28 PROCEDURE — 25010000002 ONDANSETRON PER 1 MG: Performed by: ANESTHESIOLOGY

## 2024-03-28 PROCEDURE — C1782 MORCELLATOR: HCPCS | Performed by: OBSTETRICS & GYNECOLOGY

## 2024-03-28 PROCEDURE — 25010000002 CEFAZOLIN PER 500 MG: Performed by: OBSTETRICS & GYNECOLOGY

## 2024-03-28 PROCEDURE — 25010000002 KETOROLAC TROMETHAMINE PER 15 MG: Performed by: ANESTHESIOLOGY

## 2024-03-28 PROCEDURE — 25010000002 FENTANYL CITRATE (PF) 50 MCG/ML SOLUTION: Performed by: ANESTHESIOLOGY

## 2024-03-28 PROCEDURE — 25010000002 HYDROMORPHONE PER 4 MG: Performed by: ANESTHESIOLOGY

## 2024-03-28 PROCEDURE — 58558 HYSTEROSCOPY BIOPSY: CPT | Performed by: OBSTETRICS & GYNECOLOGY

## 2024-03-28 PROCEDURE — 25810000003 LACTATED RINGERS PER 1000 ML: Performed by: STUDENT IN AN ORGANIZED HEALTH CARE EDUCATION/TRAINING PROGRAM

## 2024-03-28 PROCEDURE — 25010000002 PROPOFOL 1000 MG/100ML EMULSION: Performed by: ANESTHESIOLOGY

## 2024-03-28 PROCEDURE — 81025 URINE PREGNANCY TEST: CPT | Performed by: STUDENT IN AN ORGANIZED HEALTH CARE EDUCATION/TRAINING PROGRAM

## 2024-03-28 PROCEDURE — 25010000002 DEXAMETHASONE PER 1 MG: Performed by: ANESTHESIOLOGY

## 2024-03-28 RX ORDER — SODIUM CHLORIDE 0.9 % (FLUSH) 0.9 %
3 SYRINGE (ML) INJECTION EVERY 12 HOURS SCHEDULED
Status: DISCONTINUED | OUTPATIENT
Start: 2024-03-28 | End: 2024-03-28 | Stop reason: HOSPADM

## 2024-03-28 RX ORDER — LABETALOL HYDROCHLORIDE 5 MG/ML
5 INJECTION, SOLUTION INTRAVENOUS
Status: DISCONTINUED | OUTPATIENT
Start: 2024-03-28 | End: 2024-03-28 | Stop reason: HOSPADM

## 2024-03-28 RX ORDER — TRAMADOL HYDROCHLORIDE 50 MG/1
50 TABLET ORAL EVERY 6 HOURS PRN
Status: DISCONTINUED | OUTPATIENT
Start: 2024-03-28 | End: 2024-03-28 | Stop reason: HOSPADM

## 2024-03-28 RX ORDER — PROMETHAZINE HYDROCHLORIDE 12.5 MG/1
25 TABLET ORAL ONCE AS NEEDED
Status: DISCONTINUED | OUTPATIENT
Start: 2024-03-28 | End: 2024-03-28 | Stop reason: HOSPADM

## 2024-03-28 RX ORDER — MIDAZOLAM HYDROCHLORIDE 1 MG/ML
1 INJECTION INTRAMUSCULAR; INTRAVENOUS
Status: DISCONTINUED | OUTPATIENT
Start: 2024-03-28 | End: 2024-03-28 | Stop reason: HOSPADM

## 2024-03-28 RX ORDER — SCOLOPAMINE TRANSDERMAL SYSTEM 1 MG/1
1 PATCH, EXTENDED RELEASE TRANSDERMAL CONTINUOUS
Status: DISCONTINUED | OUTPATIENT
Start: 2024-03-28 | End: 2024-03-28 | Stop reason: HOSPADM

## 2024-03-28 RX ORDER — SODIUM CHLORIDE 0.9 % (FLUSH) 0.9 %
10 SYRINGE (ML) INJECTION EVERY 12 HOURS SCHEDULED
Status: DISCONTINUED | OUTPATIENT
Start: 2024-03-28 | End: 2024-03-28 | Stop reason: HOSPADM

## 2024-03-28 RX ORDER — SODIUM CHLORIDE 0.9 % (FLUSH) 0.9 %
3-10 SYRINGE (ML) INJECTION AS NEEDED
Status: DISCONTINUED | OUTPATIENT
Start: 2024-03-28 | End: 2024-03-28 | Stop reason: HOSPADM

## 2024-03-28 RX ORDER — KETOROLAC TROMETHAMINE 30 MG/ML
INJECTION, SOLUTION INTRAMUSCULAR; INTRAVENOUS AS NEEDED
Status: DISCONTINUED | OUTPATIENT
Start: 2024-03-28 | End: 2024-03-28 | Stop reason: SURG

## 2024-03-28 RX ORDER — FAMOTIDINE 10 MG/ML
20 INJECTION, SOLUTION INTRAVENOUS ONCE
Status: COMPLETED | OUTPATIENT
Start: 2024-03-28 | End: 2024-03-28

## 2024-03-28 RX ORDER — ONDANSETRON 2 MG/ML
4 INJECTION INTRAMUSCULAR; INTRAVENOUS ONCE AS NEEDED
Status: DISCONTINUED | OUTPATIENT
Start: 2024-03-28 | End: 2024-03-28 | Stop reason: HOSPADM

## 2024-03-28 RX ORDER — MAGNESIUM HYDROXIDE 1200 MG/15ML
LIQUID ORAL AS NEEDED
Status: DISCONTINUED | OUTPATIENT
Start: 2024-03-28 | End: 2024-03-28 | Stop reason: HOSPADM

## 2024-03-28 RX ORDER — SODIUM CHLORIDE, SODIUM LACTATE, POTASSIUM CHLORIDE, CALCIUM CHLORIDE 600; 310; 30; 20 MG/100ML; MG/100ML; MG/100ML; MG/100ML
125 INJECTION, SOLUTION INTRAVENOUS CONTINUOUS
Status: DISCONTINUED | OUTPATIENT
Start: 2024-03-28 | End: 2024-03-28 | Stop reason: HOSPADM

## 2024-03-28 RX ORDER — FENTANYL CITRATE 50 UG/ML
50 INJECTION, SOLUTION INTRAMUSCULAR; INTRAVENOUS
Status: DISCONTINUED | OUTPATIENT
Start: 2024-03-28 | End: 2024-03-28 | Stop reason: HOSPADM

## 2024-03-28 RX ORDER — IBUPROFEN 600 MG/1
600 TABLET ORAL EVERY 6 HOURS PRN
Qty: 20 TABLET | Refills: 0 | Status: SHIPPED | OUTPATIENT
Start: 2024-03-28 | End: 2024-04-02

## 2024-03-28 RX ORDER — LIDOCAINE HYDROCHLORIDE 10 MG/ML
INJECTION, SOLUTION INFILTRATION; PERINEURAL AS NEEDED
Status: DISCONTINUED | OUTPATIENT
Start: 2024-03-28 | End: 2024-03-28 | Stop reason: SURG

## 2024-03-28 RX ORDER — HYDROMORPHONE HYDROCHLORIDE 1 MG/ML
0.5 INJECTION, SOLUTION INTRAMUSCULAR; INTRAVENOUS; SUBCUTANEOUS
Status: DISCONTINUED | OUTPATIENT
Start: 2024-03-28 | End: 2024-03-28 | Stop reason: HOSPADM

## 2024-03-28 RX ORDER — ACETAMINOPHEN 500 MG
1000 TABLET ORAL ONCE
Status: DISCONTINUED | OUTPATIENT
Start: 2024-03-28 | End: 2024-03-28 | Stop reason: HOSPADM

## 2024-03-28 RX ORDER — FLUMAZENIL 0.1 MG/ML
0.2 INJECTION INTRAVENOUS AS NEEDED
Status: DISCONTINUED | OUTPATIENT
Start: 2024-03-28 | End: 2024-03-28 | Stop reason: HOSPADM

## 2024-03-28 RX ORDER — OXYCODONE HYDROCHLORIDE AND ACETAMINOPHEN 5; 325 MG/1; MG/1
1 TABLET ORAL ONCE AS NEEDED
Status: DISCONTINUED | OUTPATIENT
Start: 2024-03-28 | End: 2024-03-28 | Stop reason: HOSPADM

## 2024-03-28 RX ORDER — HYDROCODONE BITARTRATE AND ACETAMINOPHEN 5; 325 MG/1; MG/1
1 TABLET ORAL ONCE AS NEEDED
Status: DISCONTINUED | OUTPATIENT
Start: 2024-03-28 | End: 2024-03-28 | Stop reason: HOSPADM

## 2024-03-28 RX ORDER — SODIUM CHLORIDE 0.9 % (FLUSH) 0.9 %
10 SYRINGE (ML) INJECTION AS NEEDED
Status: DISCONTINUED | OUTPATIENT
Start: 2024-03-28 | End: 2024-03-28 | Stop reason: HOSPADM

## 2024-03-28 RX ORDER — NALOXONE HCL 0.4 MG/ML
0.2 VIAL (ML) INJECTION AS NEEDED
Status: DISCONTINUED | OUTPATIENT
Start: 2024-03-28 | End: 2024-03-28 | Stop reason: HOSPADM

## 2024-03-28 RX ORDER — PROMETHAZINE HYDROCHLORIDE 25 MG/1
25 SUPPOSITORY RECTAL ONCE AS NEEDED
Status: DISCONTINUED | OUTPATIENT
Start: 2024-03-28 | End: 2024-03-28 | Stop reason: HOSPADM

## 2024-03-28 RX ORDER — TRAMADOL HYDROCHLORIDE 50 MG/1
50 TABLET ORAL EVERY 6 HOURS PRN
Qty: 8 TABLET | Refills: 0 | Status: SHIPPED | OUTPATIENT
Start: 2024-03-28 | End: 2024-03-30

## 2024-03-28 RX ORDER — SODIUM CHLORIDE, SODIUM LACTATE, POTASSIUM CHLORIDE, CALCIUM CHLORIDE 600; 310; 30; 20 MG/100ML; MG/100ML; MG/100ML; MG/100ML
9 INJECTION, SOLUTION INTRAVENOUS CONTINUOUS
Status: DISCONTINUED | OUTPATIENT
Start: 2024-03-28 | End: 2024-03-28 | Stop reason: HOSPADM

## 2024-03-28 RX ORDER — DIPHENHYDRAMINE HYDROCHLORIDE 50 MG/ML
12.5 INJECTION INTRAMUSCULAR; INTRAVENOUS
Status: DISCONTINUED | OUTPATIENT
Start: 2024-03-28 | End: 2024-03-28 | Stop reason: HOSPADM

## 2024-03-28 RX ORDER — ONDANSETRON 2 MG/ML
INJECTION INTRAMUSCULAR; INTRAVENOUS AS NEEDED
Status: DISCONTINUED | OUTPATIENT
Start: 2024-03-28 | End: 2024-03-28 | Stop reason: SURG

## 2024-03-28 RX ORDER — HYDRALAZINE HYDROCHLORIDE 20 MG/ML
5 INJECTION INTRAMUSCULAR; INTRAVENOUS
Status: DISCONTINUED | OUTPATIENT
Start: 2024-03-28 | End: 2024-03-28 | Stop reason: HOSPADM

## 2024-03-28 RX ORDER — FENTANYL CITRATE 50 UG/ML
INJECTION, SOLUTION INTRAMUSCULAR; INTRAVENOUS AS NEEDED
Status: DISCONTINUED | OUTPATIENT
Start: 2024-03-28 | End: 2024-03-28 | Stop reason: SURG

## 2024-03-28 RX ORDER — IBUPROFEN 200 MG
600 TABLET ORAL EVERY 6 HOURS PRN
Status: DISCONTINUED | OUTPATIENT
Start: 2024-03-28 | End: 2024-03-28 | Stop reason: HOSPADM

## 2024-03-28 RX ORDER — DROPERIDOL 2.5 MG/ML
0.62 INJECTION, SOLUTION INTRAMUSCULAR; INTRAVENOUS
Status: DISCONTINUED | OUTPATIENT
Start: 2024-03-28 | End: 2024-03-28 | Stop reason: HOSPADM

## 2024-03-28 RX ORDER — ACETAMINOPHEN 500 MG
1000 TABLET ORAL ONCE
Status: COMPLETED | OUTPATIENT
Start: 2024-03-28 | End: 2024-03-28

## 2024-03-28 RX ORDER — OXYCODONE AND ACETAMINOPHEN 7.5; 325 MG/1; MG/1
1 TABLET ORAL EVERY 4 HOURS PRN
Status: DISCONTINUED | OUTPATIENT
Start: 2024-03-28 | End: 2024-03-28 | Stop reason: HOSPADM

## 2024-03-28 RX ORDER — PROPOFOL 10 MG/ML
INJECTION, EMULSION INTRAVENOUS AS NEEDED
Status: DISCONTINUED | OUTPATIENT
Start: 2024-03-28 | End: 2024-03-28 | Stop reason: SURG

## 2024-03-28 RX ORDER — DEXAMETHASONE SODIUM PHOSPHATE 4 MG/ML
INJECTION, SOLUTION INTRA-ARTICULAR; INTRALESIONAL; INTRAMUSCULAR; INTRAVENOUS; SOFT TISSUE AS NEEDED
Status: DISCONTINUED | OUTPATIENT
Start: 2024-03-28 | End: 2024-03-28 | Stop reason: SURG

## 2024-03-28 RX ADMIN — TRAMADOL HYDROCHLORIDE 50 MG: 50 TABLET ORAL at 14:48

## 2024-03-28 RX ADMIN — FAMOTIDINE 20 MG: 10 INJECTION INTRAVENOUS at 13:20

## 2024-03-28 RX ADMIN — FENTANYL CITRATE 25 MCG: 50 INJECTION, SOLUTION INTRAMUSCULAR; INTRAVENOUS at 13:34

## 2024-03-28 RX ADMIN — SODIUM CHLORIDE, POTASSIUM CHLORIDE, SODIUM LACTATE AND CALCIUM CHLORIDE 9 ML/HR: 600; 310; 30; 20 INJECTION, SOLUTION INTRAVENOUS at 12:53

## 2024-03-28 RX ADMIN — LIDOCAINE HYDROCHLORIDE 60 MG: 10 INJECTION, SOLUTION INFILTRATION; PERINEURAL at 13:34

## 2024-03-28 RX ADMIN — SCOPALAMINE 1 PATCH: 1 PATCH, EXTENDED RELEASE TRANSDERMAL at 12:53

## 2024-03-28 RX ADMIN — SODIUM CHLORIDE 2000 MG: 900 INJECTION INTRAVENOUS at 13:25

## 2024-03-28 RX ADMIN — ACETAMINOPHEN 1000 MG: 500 TABLET ORAL at 12:53

## 2024-03-28 RX ADMIN — PROPOFOL 160 MG: 10 INJECTION, EMULSION INTRAVENOUS at 13:34

## 2024-03-28 RX ADMIN — DEXAMETHASONE SODIUM PHOSPHATE 8 MG: 4 INJECTION, SOLUTION INTRA-ARTICULAR; INTRALESIONAL; INTRAMUSCULAR; INTRAVENOUS; SOFT TISSUE at 13:38

## 2024-03-28 RX ADMIN — FENTANYL CITRATE 25 MCG: 50 INJECTION, SOLUTION INTRAMUSCULAR; INTRAVENOUS at 13:44

## 2024-03-28 RX ADMIN — KETOROLAC TROMETHAMINE 30 MG: 30 INJECTION, SOLUTION INTRAMUSCULAR at 14:00

## 2024-03-28 RX ADMIN — ONDANSETRON 4 MG: 2 INJECTION INTRAMUSCULAR; INTRAVENOUS at 14:00

## 2024-03-28 RX ADMIN — HYDROMORPHONE HYDROCHLORIDE 0.5 MG: 1 INJECTION, SOLUTION INTRAMUSCULAR; INTRAVENOUS; SUBCUTANEOUS at 14:25

## 2024-03-28 NOTE — OP NOTE
Procedure:   1.  Hysteroscopy  2.  Resection of endometrial mass with MyoSure     Surgeon: Triston Torres MD     Assistant: none     Preop diagnosis:   1.  33-year-old female with thickened endometrium with a suspected endometrial mass  2.  Persistent elevation of hCG following miscarriage, not currently pregnant     Postop diagnosis: Same     Indications: Patient had a spontaneous miscarriage in January 2024.  She had some continued bleeding after this.  Workup for this ultrasound was performed which showed about a 2 cm suspected intrauterine mass.  This persisted on repeat imaging about 2 weeks later.  hCG levels were declining after the miscarriage; however, plateaued at 23.  The hCG had remained stable at 23 without subsequent rise, as such excluding viable pregnancy.  Explained to patient differential diagnosis of endometrial mass including polyp, fibroid, gestational trophoblastic disease, uterine malignancy, retained products of conception, etc.  Discussed importance of resection for diagnostic purposes.  Risk, benefits, alternatives were discussed with the patient at length.  She verbalized understanding and wished to proceed.    Findings: Hysteroscopic findings revealed about a 1 x 2 cm collection of apparent smooth muscle tissue noted within the endometrial cavity.  There was significant vascularity to this area.  Differential diagnosis includes leiomyoma, polyp, less likely retained products of conception.  After resection of the mass, normal-appearing endometrial cavity noted.  Bilateral tubal ostia visualized.     Anesthesia: GETA     EBL: 50 mL   .    Distending media: Normal saline, fluid deficit 500 mL  Of note, fluid deficit likely over estimation as there was a significant amount of saline spilled onto the floor and onto the surgeon's legs and feet due to improper set up of the collection bag.  Very likely minimal to no true fluid deficit noted.     Pathology:   1.  Endometrial mass collected via  Saúl     Complications: None     Procedure: Patient was taken to operating room. After adequate Gen. anesthesia was placed on OR table in dorsal lithotomy position in candycane stirrups. The abdomen, vagina, perineum, and rectum was prepped and draped in a normal, sterile fashion. Patient identified with two identifiers and timeout performed with all team members agreeing to the planned procedure.  A weighted speculum was placed in the vagina, and anterior aspects of tract with a right angle retractor.  The anterior lip of the cervix was grasped with a single-tooth tenaculum.  The uterus was then sounded to 8 centimeters.  The cervix was gently dilated with Arturo dilators. Hysteroscopy was performed.  Hysteroscopic findings were as noted above. The Myosure reached device was passed through the hysteroscope and the entirety of the visible lesion/mass was resected back to the endometrial layer..  Good hemostasis was noted.  The hysteroscope was then removed.  The single-tooth tenaculum was removed from the cervix.  Silver nitrate was applied to the cervix and puncture sites, good hemostasis was noted.  All other instruments removed the vagina.  Counts for needles, sponges, laps and instruments were correct times two at the end of the procedure. I was present and scrubbed for the entire procedure. There were no major complications. The patient was transported to the recovery area in stable condition.

## 2024-03-28 NOTE — ANESTHESIA PROCEDURE NOTES
Airway  Urgency: elective    Date/Time: 3/28/2024 1:34 PM  Airway not difficult    General Information and Staff    Patient location during procedure: OR  Anesthesiologist: Charley Chand MD    Indications and Patient Condition  Indications for airway management: airway protection    Preoxygenated: yes  Mask difficulty assessment: 1 - vent by mask    Final Airway Details  Final airway type: supraglottic airway      Successful airway: unique  Size 4  Cuff Pressure (cm H2O): 20     Number of attempts at approach: 1  Assessment: lips, teeth, and gum same as pre-op and atraumatic intubation

## 2024-03-28 NOTE — ANESTHESIA POSTPROCEDURE EVALUATION
Patient: Anila Sepulveda    Procedure Summary       Date: 03/28/24 Room / Location: [facility] OR 02 / [facility]    Anesthesia Start: 1328 Anesthesia Stop: 1413    Procedure: DILATATION AND CURETTAGE HYSTEROSCOPY WITH MYOSURE RESECTION OF UTERINE MASS (Uterus) Diagnosis:       Elevated serum hCG in female, not pregnant      Endometrial mass      (Elevated serum hCG in female, not pregnant [R79.89])      (Endometrial mass [N94.89])    Surgeons: Yossi Torres MD Provider: Charley Chand MD    Anesthesia Type: general ASA Status: 1            Anesthesia Type: general    Vitals  Vitals Value Taken Time   /92 03/28/24 1452   Temp 36.6 °C (97.9 °F) 03/28/24 1412   Pulse 89 03/28/24 1455   Resp 14 03/28/24 1451   SpO2 100 % 03/28/24 1455   Vitals shown include unfiled device data.        Post Anesthesia Care and Evaluation    Patient location during evaluation: bedside  Patient participation: complete - patient participated  Level of consciousness: awake and alert  Pain management: adequate    Airway patency: patent  Anesthetic complications: No anesthetic complications  PONV Status: controlled  Cardiovascular status: acceptable  Respiratory status: acceptable  Hydration status: acceptable

## 2024-03-28 NOTE — H&P
[facility]   HISTORY AND PHYSICAL    Patient Name: Anila Sepulveda  : 1990  MRN: [id number]  Primary Care Physician:  Frannie Vanegas APRN  Date of admission: (Not on file)    Subjective   Subjective     Chief Complaint: Surgery    HPI:    Anila Sepulveda is a 33 y.o. female patient presents today for scheduled surgical hysteroscopy, dilation curettage, and excision of uterine mass/polyp.  Patient recently had a spontaneous miscarriage.  Upon workup for this with ultrasound was noted to have a intrauterine mass which likely represent an endometrial polyp but with broad differential.  They recommended removal of the mass for both diagnostic and therapeutic purposes.  She does report a history of irregular menses.    She was originally scheduled for this procedure about 10 days ago; however, there was an initial slight elevation of her hCG level.  Subsequent testing has shown stability the hCG level which has excluded reliably a viable pregnancy.  Repeat ultrasound also showed persistence of the intrauterine mass without signs of intra or extrauterine pregnancy.    She is in her usual state of health at this time.    Review of Systems     Constitutional: No fevers, chills, sweats   Eye: No recent visual problems, denies blurry vision   HEENT: No ear pain, nasal congestion, sore throat, voice changes   Respiratory: No shortness of breath, cough, pain on breathing   Cardiovascular: No Chest pain, palpitations   Gastrointestinal: No nausea, vomiting, diarrhea, constipation   Genitourinary: No hematuria, dysuria, lesions on genitalia   Hema/Lymph: Negative for bruising, no edema   Endocrine: Negative for excessive thirst, excessive hunger, heat or cold intolerance   Musculoskeletal: No joint pain, muscle pain, decreased range of motion   Integumentary: No rash, pruritus, abrasions, lesions   Neurologic: No weakness, numbness, headaches   Psychiatric: No anxiety, depression, mood changes          Personal History     Past Medical History:   Diagnosis Date    Irregular uterine bleeding     Spontaneous miscarriage 01/2024       Past Surgical History:   Procedure Laterality Date    HYSTEROSCOPY         Family History: family history is not on file. Otherwise pertinent FHx was reviewed and not pertinent to current issue.    Social History:  reports that she has never smoked. She has never used smokeless tobacco. She reports current alcohol use. She reports that she does not use drugs.    Home Medications:  Prenatal Vitamin Plus Low Iron      Allergies:  No Known Allergies    Objective   Objective     Vitals:      Physical Exam     General: No acute distress, awake and oriented x3   HEENT: Normocephalic atraumatic, moist mucous membranes   Eyes: Pupils equal round reactive to light and accommodation, extraocular muscles intact   Respiratory: Normal work of breathing, good air movement   Extremities: No calf tenderness, no lower extremity edema   Psychiatric: Good judgment and insight, normal affect and mood   Neurologic: Cranial nerves II through XII intact, no deficits   Skin: No rashes or lesions       Result Review    Result Review:  I have personally reviewed the results from the time of this admission to 3/28/2024 07:25 EDT and agree with these findings:  [x]  Laboratory list / accordion  []  Microbiology  [x]  Radiology  []  EKG/Telemetry   []  Cardiology/Vascular   []  Pathology  []  Old records  []  Other:  Most notable findings include:   Component  Ref Range & Units 10 d ago 13 d ago 4 wk ago 1 mo ago   HCG Quantitative  mIU/mL 23 23.70 CM 21 CM 43 CM                            1/23/24  Component  Ref Range & Units 4 wk ago Comments   HCG QUANTITATIVE  mIU/mL 108.6        1/7/24  Component  Ref Range & Units 1 mo ago Comments   hCG Quant  0 - 4 mInt Units/mL 1,204 High         ABORh O POS     Ultrasound (3/18/24):  Findings:  Uterus measures 8.36 x 5.71 x 4.43 cm, volume 110.725 cm cube  There  are no intramural or subserosal fibroids or masses identified.  Endometrial thickness measures 2.25 cm.  There is again noted to be an intrauterine endometrial mass which overall appears similar to the recent ultrasound on 2/27.  The largest measurement today is 2.8 x 2.4 cm, which is slightly increased in size from the previous ultrasound; however, this could be related to interobserver error.  There is noted to be vascular flow to the periphery of the mass, similar in appearance to the previous ultrasound.  Cervix is normal-appearing.     Left ovary: 2.53 x 1.77 x 1.77 cm, volume 4.150 cm cube  There is no adnexal mass or cyst identified.     Right ovary: 2.24 x 1.10 x 1.14 cm, volume 1.471 cm cube  There is no adnexal mass or cyst identified.     There is no free fluid.     Impression:     There is again noted to be an intrauterine endometrial mass, today measuring 2.8 x 2.4 cm.  Differential diagnosis would include endometrial polyp, submucosal fibroid, retained products of conception, gestational trophoblastic disease/partial molar pregnancy, uterine malignancy.  No intra or extrauterine gestation is identified.  Otherwise normal ultrasound.    Ultrasound (2/27/24):  Findings:  Uterus measures 9.38 x 6.70 x 5.59 cm, volume 183.945 cm cube  There are no intramural or subserosal fibroids or masses identified.  Endometrial thickness measures 1.46 cm.  There is a vascular endometrial mass measuring 2.23 x 1.24 cm which likely represents an endometrial polyp.  Cervix is normal-appearing.     Left ovary: 2.42 x 1.98 x 2.19 cm, volume 5.494 cm cube  There is no adnexal mass or cyst identified.     Right ovary: 2.75 x 1.83 x 2.55 cm, volume 6.719 cm cube  There is no adnexal mass or cyst identified.     There is no free fluid.     Impression:     2.2 cm endometrial mass which is likely consistent with a polyp.  Otherwise normal pelvic ultrasound      Assessment & Plan   Assessment / Plan     Brief Patient  Summary:  Anila Sepulveda is a 33 y.o. female status post spontaneous pregnancy loss with endometrial mass    Active Hospital Problems:  Active Hospital Problems    Diagnosis     **Elevated serum hCG in female, not pregnant     Endometrial mass      Plan:   1.  Patient has had recent spontaneous miscarriage.  Now has plateauing hCG levels around 20.  Recent imaging suggested a roughly 2 cm intrauterine mass of unclear origin.  Differential diagnosis would include retained products of conception, clot, polyp, fibroid, partial mole, malignancy, etc. There is clearly no viable pregnancy giving falling hcg levels.   We discussed significance of the ultrasound findings that appear to be suspicious for an endometrial polyp versus some other type of endometrial mass.  I would recommend hysteroscopy, dilation curettage, and MyoSure excision/polypectomy for both diagnostic and therapeutic purposes.   Explained Myosure would allow for diagnostic evaluation.  Additionally, this is likely the cause of her irregular bleeding and potentially may lead to complications in future pregnancies.  As the patient does desire to conceive in the near future, I would recommend removal of this mass.   The risk, benefits, alternatives to the surgery were discussed with the patient at length.  Surgical risk including the risk of bleeding, infection, scarring, weakening of the cervix, possibility of  labor/delivery, intrauterine adhesions, subfertility, infertility, uterine perforation, inadvertent injury to GI/ structures, anesthesia complications, etc. discussed with the patient at length.  All questions answered and she wishes to proceed with surgery.  2. Routine preop antibiotics with cefazolin 2 grams.    DVT prophylaxis:  SCDs      CODE STATUS:  Full code     Admission Status:  I believe this patient meets outpatient status.    Yossi Torres MD

## 2024-03-30 LAB
LAB AP CASE REPORT: NORMAL
LAB AP DIAGNOSIS COMMENT: NORMAL
PATH REPORT.FINAL DX SPEC: NORMAL
PATH REPORT.GROSS SPEC: NORMAL

## 2024-04-01 ENCOUNTER — TELEPHONE (OUTPATIENT)
Dept: OBSTETRICS AND GYNECOLOGY | Facility: CLINIC | Age: 34
End: 2024-04-01
Payer: MEDICAID

## 2024-04-01 NOTE — TELEPHONE ENCOUNTER
Patient is requesting a letter to excuse her from work from Friday 3/29 following her surgery. Please advise if this is okay.  This is a [facility] patient.     Thanks

## 2024-04-10 ENCOUNTER — OFFICE VISIT (OUTPATIENT)
Dept: OBSTETRICS AND GYNECOLOGY | Facility: CLINIC | Age: 34
End: 2024-04-10
Payer: MEDICAID

## 2024-04-10 VITALS
WEIGHT: 162 LBS | DIASTOLIC BLOOD PRESSURE: 74 MMHG | SYSTOLIC BLOOD PRESSURE: 108 MMHG | HEIGHT: 68 IN | BODY MASS INDEX: 24.55 KG/M2

## 2024-04-10 DIAGNOSIS — R10.2 PELVIC CRAMPING: ICD-10-CM

## 2024-04-10 DIAGNOSIS — Z09 POSTOPERATIVE EXAMINATION: ICD-10-CM

## 2024-04-10 DIAGNOSIS — Z30.09 FAMILY PLANNING ADVICE: ICD-10-CM

## 2024-04-10 DIAGNOSIS — R79.89 ELEVATED SERUM HCG IN FEMALE, NOT PREGNANT: ICD-10-CM

## 2024-04-10 DIAGNOSIS — N94.89 ENDOMETRIAL MASS: Primary | ICD-10-CM

## 2024-04-10 PROCEDURE — 99024 POSTOP FOLLOW-UP VISIT: CPT | Performed by: OBSTETRICS & GYNECOLOGY

## 2024-04-10 RX ORDER — IBUPROFEN 600 MG/1
600 TABLET ORAL EVERY 8 HOURS PRN
Qty: 30 TABLET | Refills: 0 | Status: SHIPPED | OUTPATIENT
Start: 2024-04-10

## 2024-04-10 RX ORDER — PNV NO.95/FERROUS FUM/FOLIC AC 28MG-0.8MG
1 TABLET ORAL DAILY
Qty: 90 TABLET | Refills: 3 | Status: SHIPPED | OUTPATIENT
Start: 2024-04-10

## 2024-04-10 NOTE — PROGRESS NOTES
"Chief Complaint  Post-op (Patient is here for postop (3/28 d&c hysteroscopy with myosure resection of uterine mass, needs a refill on medication from last visit? But looking past few visits no meds other than doxycyline, and metronidazole are only I'm seeing she's not sure, is still complaining of pelvic pain) )    Subjective        Anila Sepulveda presents to [facility]  History of Present Illness  Patient here for follow-up 2 weeks after hysteroscopy, dilation curettage, and MyoSure.  Surgical findings included below but were significant for retained products of conception.  Patient reports still having some intermittent occasional abdominal cramping.  She request refill of ibuprofen.  Otherwise she is doing well.  She denies any bleeding at this time.    Patient does report desires to try to conceive again in the near future.    The following portions of the patient's history were reviewed and updated as appropriate: allergies, current medications, past family history, past medical history, past social history, past surgical history, and problem list.    Objective   Vital Signs:  /74   Ht 172.7 cm (67.99\")   Wt 73.5 kg (162 lb)   BMI 24.64 kg/m²   Estimated body mass index is 24.64 kg/m² as calculated from the following:    Height as of this encounter: 172.7 cm (67.99\").    Weight as of this encounter: 73.5 kg (162 lb).       BMI is within normal parameters. No other follow-up for BMI required.      Physical Exam   General: No acute distress, awake and oriented x3  Psychiatric: good judgment and insight, normal mood  Neurological: cranial nerves II through XII intact, no deficits    Result Review :            1.  Uterine mass/endometrial curettings:               A.  Degenerating decidualized endometrial tissue, immature chorionic villi and scant secretory endometrium                      consistent with products of conception.           Assessment and Plan     Diagnoses and all " orders for this visit:    1. Endometrial mass (Primary)    2. Elevated serum hCG in female, not pregnant  -     HCG, B-subunit, Quantitative    3. Postoperative examination    Patient has recovered well from surgery.  May resume normal activities.  We discussed surgical findings and pathology results.  We will repeat her hCG level today to make sure it is returned to 0.    We discussed patient's plans for future pregnancies.  We have discussed contraception versus expectant management.  Patient reports that she would like to try to conceive in the near future.  I explained that once her hCG is negative she may resume trials of trying to conceive.  Recommended that she continue a daily prenatal vitamin if she is trying to conceive.  She verbalized understanding.         Follow Up     No follow-ups on file.  Patient was given instructions and counseling regarding her condition or for health maintenance advice. Please see specific information pulled into the AVS if appropriate.

## 2024-04-11 LAB — HCG INTACT+B SERPL-ACNC: <1 MIU/ML

## 2024-06-07 ENCOUNTER — TELEPHONE (OUTPATIENT)
Dept: OBSTETRICS AND GYNECOLOGY | Facility: CLINIC | Age: 34
End: 2024-06-07

## 2024-06-07 DIAGNOSIS — N91.2 ABSENT MENSES: Primary | ICD-10-CM

## 2024-06-07 NOTE — TELEPHONE ENCOUNTER
Caller: Anila Sepulveda    Relationship: Self    Best call back number: [phone]    What orders are you requesting (i.e. lab or imaging): HCG     In what timeframe would the patient need to come in: ASAP     Where will you receive your lab/imaging services: OFFICE     Additional notes: PT WOULD LIKE HCG LAB DRAWN HER LAST MENSTRUAL CYCLE WAS 4/9/24 AND SHE HAS NAUSEA A LOT PLEASE CALL

## 2024-06-21 DIAGNOSIS — N91.2 ABSENT MENSES: ICD-10-CM

## 2024-06-21 NOTE — TELEPHONE ENCOUNTER
Dr. Torres,    Patient is requesting to come in and do a HCG. Can you place the order?    Please advise  Thanks Carmen    yes

## 2024-06-22 LAB — HCG INTACT+B SERPL-ACNC: NORMAL MIU/ML

## 2024-06-26 ENCOUNTER — TELEPHONE (OUTPATIENT)
Dept: OBSTETRICS AND GYNECOLOGY | Facility: CLINIC | Age: 34
End: 2024-06-26
Payer: MEDICAID

## 2024-06-27 ENCOUNTER — INITIAL PRENATAL (OUTPATIENT)
Dept: OBSTETRICS AND GYNECOLOGY | Facility: CLINIC | Age: 34
End: 2024-06-27
Payer: MEDICAID

## 2024-06-27 VITALS — DIASTOLIC BLOOD PRESSURE: 87 MMHG | WEIGHT: 162 LBS | BODY MASS INDEX: 24.64 KG/M2 | SYSTOLIC BLOOD PRESSURE: 127 MMHG

## 2024-06-27 DIAGNOSIS — Z34.90 PREGNANCY, UNSPECIFIED GESTATIONAL AGE: Primary | ICD-10-CM

## 2024-06-27 DIAGNOSIS — O21.9 NAUSEA AND VOMITING DURING PREGNANCY: ICD-10-CM

## 2024-06-27 DIAGNOSIS — O09.529 ANTEPARTUM MULTIGRAVIDA OF ADVANCED MATERNAL AGE: ICD-10-CM

## 2024-06-27 PROBLEM — R79.89 ELEVATED SERUM HCG IN FEMALE, NOT PREGNANT: Status: RESOLVED | Noted: 2024-03-20 | Resolved: 2024-06-27

## 2024-06-27 PROBLEM — N94.89 ENDOMETRIAL MASS: Status: RESOLVED | Noted: 2024-02-28 | Resolved: 2024-06-27

## 2024-06-27 LAB
B-HCG UR QL: POSITIVE
EXPIRATION DATE: ABNORMAL
GLUCOSE UR STRIP-MCNC: NEGATIVE MG/DL
INTERNAL NEGATIVE CONTROL: ABNORMAL
INTERNAL POSITIVE CONTROL: ABNORMAL
Lab: ABNORMAL
PROT UR STRIP-MCNC: NEGATIVE MG/DL

## 2024-06-27 PROCEDURE — 81025 URINE PREGNANCY TEST: CPT | Performed by: OBSTETRICS & GYNECOLOGY

## 2024-06-27 PROCEDURE — 99214 OFFICE O/P EST MOD 30 MIN: CPT | Performed by: OBSTETRICS & GYNECOLOGY

## 2024-06-27 NOTE — PROGRESS NOTES
"Chief Complaint  Initial Prenatal Visit    Subjective        Anila Sepulveda presents to [facility]  History of Present Illness  Patient is here for initial prenatal appointment.  She had a miscarriage in 2024.  She subsequently required D&C for retained products of conception.  After the D&C, her hCG had returned to 0 when it was last checked on 4/10/2024.  This is a documented new pregnancy.  Patient does not recall having a menstrual cycle between her D&C and when she found out she was pregnant.  Based on today's ultrasound, EDC is 2025 with estimated conception around 2024.  Patient reports she is experiencing nausea with occasional episodes of emesis.  Denies contractions, vaginal bleeding, leakage of fluid.    Patient has a history of 3 previous vaginal deliveries at term without complication.      # Outcome Date GA Labor/2nd Weight Sex Type Anes PTL Lv A1 A5   8 Current                 7 SAB 24 7w0d               6      M Vag-Spont   Living        5      F Vag-Spont   Living        4      F Vag-Spont   Living        3 IAB                 2 IAB                 1 IAB                The following portions of the patient's history were reviewed and updated as appropriate: allergies, current medications, past family history, past medical history, past social history, past surgical history, and problem list.    Objective   Vital Signs:  /87   Wt 73.5 kg (162 lb)   BMI 24.64 kg/m²   Estimated body mass index is 24.64 kg/m² as calculated from the following:    Height as of 4/10/24: 172.7 cm (67.99\").    Weight as of this encounter: 73.5 kg (162 lb).       BMI is within normal parameters. No other follow-up for BMI required.      Physical Exam   General: No acute distress, awake and oriented x3   HEENT: Normocephalic atraumatic, moist mucous membranes   Eyes: Pupils equal round reactive to light and accommodation, extraocular muscles " intact   Respiratory: Normal work of breathing, good air movement   Extremities: No calf tenderness, no lower extremity edema   Psychiatric: Good judgment and insight, normal affect and mood   Neurologic: Cranial nerves II through XII intact, no deficits   Skin: No rashes or lesions     Result Review :    The following data was reviewed by: Yossi Torres MD on 06/27/2024:        Initial Prenatal on 06/27/2024   Component Date Value Ref Range Status    Glucose, UA 06/27/2024 Negative  Negative mg/dL Final    Protein, POC 06/27/2024 Negative  Negative mg/dL Final    HCG, Urine, QL 06/27/2024 Positive (A)  Negative Final    Lot Number 06/27/2024 [id number]   Final    Internal Positive Control 06/27/2024 Passed  Positive, Passed Final    Internal Negative Control 06/27/2024 Passed  Negative, Passed Final    Expiration Date 06/27/2024 10/5/25   Final       US today  Findings:  There is a live single intrauterine gestation identified.  Intrauterine gestational sac is seen.  There is a normal-appearing yolk sac noted.  There is a fetal pole identified with a crown-rump length measuring 2.67 cm, consistent with 9 weeks and 3 days.  Fetal cardiac activity is detected measuring 132 bpm.     Neither ovary is clearly seen.  There are no adnexal masses noted. There is no free fluid.     Impression:  Live single intrauterine pregnancy measuring 9 weeks and 3 days today.         Assessment and Plan     Diagnoses and all orders for this visit:    1. Pregnancy, unspecified gestational age (Primary)  -     POC Urinalysis Dipstick  -     POC Pregnancy, Urine    2. Antepartum multigravida of advanced maternal age    Initial prenatal counseling performed today. Educational handouts on prenatal care provided to patient. Discussed frequency of visits, lab testing, OTC medications, physical activity, exercise, dietary restrictions, potential exposures (COVID, Zika, Toxo, etc). Hospital and call coverage system discussed. Pt is  recommended to start PNV.     Pap smear is up-to-date.  Patient cannot provide a urine sample today that was adequate to send for culture, GC.  We will do this at the next visit.  Will also perform routine prenatal lab work at the next visit at the time of NIPT testing.    I discussed with patient the risks of increasing complications with advancing maternal age.  We discussed higher rates of miscarriage, birth defects, chromosomal abnormalities,  labor,  delivery, medical complications of pregnancy such as gestational diabetes and hypertension,  section, severe maternal morbidity, and increased rates of maternal mortality.  We discussed methods of screening for chromosomal aneuploidy including targeted ultrasound, first trimester serum screening, sequential serum screening, cell free DNA testing, and invasive diagnostic methods such as chorionic villous sampling and amniocentesis.  We also discussed the option of no screening.  Patient requests NIPT testing.  This can be done at the time of the next visit.       3. Nausea and vomiting during pregnancy  -     doxylamine-pyridoxine ER (BONJESTA) 20-20 MG tablet controlled-release tablet; Take 1 tablet by mouth Every 12 (Twelve) Hours.  Dispense: 60 tablet; Refill: 2    Discussed nausea and vomiting in pregnancy. Discussed diet and lifestyle modifications to help prevent nausea. Discussed use of vitamin B6 and doxylamine up to three times a day as first line pharmacologic therapy. Will send prescription to pharmacy. Weight gain expectations discussed with pt.           Follow Up     Return OB FU 3 wks.  Patient was given instructions and counseling regarding her condition or for health maintenance advice. Please see specific information pulled into the AVS if appropriate.

## 2024-07-17 ENCOUNTER — ROUTINE PRENATAL (OUTPATIENT)
Dept: OBSTETRICS AND GYNECOLOGY | Facility: CLINIC | Age: 34
End: 2024-07-17
Payer: MEDICAID

## 2024-07-17 VITALS — SYSTOLIC BLOOD PRESSURE: 118 MMHG | WEIGHT: 164.2 LBS | DIASTOLIC BLOOD PRESSURE: 72 MMHG | BODY MASS INDEX: 24.97 KG/M2

## 2024-07-17 DIAGNOSIS — Z31.49 PROCREATION MANAGEMENT INVESTIGATION AND TESTING: ICD-10-CM

## 2024-07-17 DIAGNOSIS — Z11.3 SCREEN FOR SEXUALLY TRANSMITTED DISEASES: ICD-10-CM

## 2024-07-17 DIAGNOSIS — Z36.0 ENCOUNTER FOR ANTENATAL SCREENING FOR CHROMOSOMAL ANOMALIES: ICD-10-CM

## 2024-07-17 DIAGNOSIS — O09.529 ANTEPARTUM MULTIGRAVIDA OF ADVANCED MATERNAL AGE: Primary | ICD-10-CM

## 2024-07-17 PROCEDURE — 99213 OFFICE O/P EST LOW 20 MIN: CPT | Performed by: OBSTETRICS & GYNECOLOGY

## 2024-07-22 NOTE — PROGRESS NOTES
Chief complaint: Patient here for routine OB visit.    History of present illness: No major complaints at this time.  She denies contractions, vaginal bleeding, leakage of fluid.  She reports active fetal movement.    Objective: See vital signs in the flowsheet  General: No acute distress, awake and oriented x3  Abdomen: Soft, nontender, gravid, fetal heart tones 155,  Extremities: No lower extremity edema, no calf tenderness  Psychiatric: Good judgment insight, normal affect and mood  Neurologic: Cranial nerves II through XII intact, no gross deficits    Ultrasound today: Normal anatomic survey, appropriate growth    Assessment:  1.  34-year-old  8 para 3-0-4-3 at 12-2/7 weeks gestational age  2.  Advanced maternal age    Plan:  1.  Patient counseled on screening options for aneuploidy.  Risk, benefits, alternatives, limitations of testing were explained.  All questions answered.  She wished to proceed with NIPT test today.  It is also due for routine obstetrical panel today.  2.  Return to the office in 4 weeks.  3.  Otherwise routine prenatal care.

## 2024-07-24 LAB
5P15 DELETION (CRI-DU-CHAT): NOT DETECTED
ABO GROUP BLD: NORMAL
AMPHETAMINES UR QL SCN: NEGATIVE NG/ML
BACTERIA UR CULT: NORMAL
BACTERIA UR CULT: NORMAL
BARBITURATES UR QL SCN: NEGATIVE NG/ML
BASOPHILS # BLD AUTO: 0.1 X10E3/UL (ref 0–0.2)
BASOPHILS NFR BLD AUTO: 1 %
BENZODIAZ UR QL SCN: NEGATIVE NG/ML
BLD GP AB SCN SERPL QL: NEGATIVE
BZE UR QL SCN: NEGATIVE NG/ML
C TRACH RRNA SPEC QL NAA+PROBE: NORMAL
CANNABINOIDS UR QL SCN: NEGATIVE NG/ML
CFDNA.FET/CFDNA.TOTAL SFR FETUS: NORMAL %
CITATION REF LAB TEST: NORMAL
CREAT UR-MCNC: 194.9 MG/DL (ref 20–300)
EOSINOPHIL # BLD AUTO: 0.1 X10E3/UL (ref 0–0.4)
EOSINOPHIL NFR BLD AUTO: 2 %
ERYTHROCYTE [DISTWIDTH] IN BLOOD BY AUTOMATED COUNT: 13 % (ref 11.7–15.4)
FET 13+18+21+X+Y ANEUP PLAS.CFDNA: NEGATIVE
FET 1P36 DEL RISK WBC.DNA+CFDNA QL: NOT DETECTED
FET 22Q11.2 DEL RISK WBC.DNA+CFDNA QL: NOT DETECTED
FET CHR 11Q23 DEL PLAS.CFDNA QL: NOT DETECTED
FET CHR 15Q11 DEL PLAS.CFDNA QL: NOT DETECTED
FET CHR 21 TS PLAS.CFDNA QL: NEGATIVE
FET CHR 4P16 DEL PLAS.CFDNA QL: NOT DETECTED
FET CHR 8Q24 DEL PLAS.CFDNA QL: NOT DETECTED
FET MS X RISK WBC.DNA+CFDNA QL: NOT DETECTED
FET SEX PLAS.CFDNA DOSAGE CFDNA: NORMAL
FET TS 13 RISK PLAS.CFDNA QL: NEGATIVE
FET TS 18 RISK WBC.DNA+CFDNA QL: NEGATIVE
FET X + Y ANEUP RISK PLAS.CFDNA SEQ-IMP: NOT DETECTED
GA EST FROM CONCEPTION DATE: NORMAL D
GESTATIONAL AGE > 9:: YES
HBV SURFACE AG SERPL QL IA: NEGATIVE
HCT VFR BLD AUTO: 36.2 % (ref 34–46.6)
HCV AB SERPL QL IA: NORMAL
HCV IGG SERPL QL IA: NON REACTIVE
HGB A MFR BLD ELPH: 96.8 % (ref 96.4–98.8)
HGB A2 MFR BLD ELPH: 2.4 % (ref 1.8–3.2)
HGB BLD-MCNC: 12.2 G/DL (ref 11.1–15.9)
HGB F MFR BLD ELPH: 0.8 % (ref 0–2)
HGB FRACT BLD-IMP: NORMAL
HGB S MFR BLD ELPH: 0 %
HIV 1+2 AB+HIV1 P24 AG SERPL QL IA: NON REACTIVE
IMM GRANULOCYTES # BLD AUTO: 0.1 X10E3/UL (ref 0–0.1)
IMM GRANULOCYTES NFR BLD AUTO: 1 %
LAB DIRECTOR NAME PROVIDER: NORMAL
LAB DIRECTOR NAME PROVIDER: NORMAL
LABORATORY COMMENT REPORT: NORMAL
LABORATORY COMMENT REPORT: NORMAL
LIMITATIONS OF THE TEST: NORMAL
LYMPHOCYTES # BLD AUTO: 2.8 X10E3/UL (ref 0.7–3.1)
LYMPHOCYTES NFR BLD AUTO: 32 %
MCH RBC QN AUTO: 30.9 PG (ref 26.6–33)
MCHC RBC AUTO-ENTMCNC: 33.7 G/DL (ref 31.5–35.7)
MCV RBC AUTO: 92 FL (ref 79–97)
METHADONE UR QL SCN: NEGATIVE NG/ML
MONOCYTES # BLD AUTO: 0.5 X10E3/UL (ref 0.1–0.9)
MONOCYTES NFR BLD AUTO: 5 %
N GONORRHOEA RRNA SPEC QL NAA+PROBE: NORMAL
NEGATIVE PREDICTIVE VALUE: NORMAL
NEUTROPHILS # BLD AUTO: 5.4 X10E3/UL (ref 1.4–7)
NEUTROPHILS NFR BLD AUTO: 59 %
NOTE: NORMAL
OPIATES UR QL SCN: NEGATIVE NG/ML
OXYCODONE+OXYMORPHONE UR QL SCN: NEGATIVE NG/ML
PCP UR QL: NEGATIVE NG/ML
PERFORMANCE CHARACTERISTICS: NORMAL
PH UR: 6 [PH] (ref 4.5–8.9)
PLATELET # BLD AUTO: 337 X10E3/UL (ref 150–450)
POSITIVE PREDICTIVE VALUE: NORMAL
PROPOXYPH UR QL SCN: NEGATIVE NG/ML
RBC # BLD AUTO: 3.95 X10E6/UL (ref 3.77–5.28)
REF LAB TEST METHOD: NORMAL
RH BLD: POSITIVE
RUBV IGG SERPL IA-ACNC: 14.3 INDEX
SPECIMEN STATUS: NORMAL
T VAGINALIS RRNA SPEC QL NAA+PROBE: NORMAL
TEST PERFORMANCE INFO SPEC: NORMAL
TREPONEMA PALLIDUM IGG+IGM AB [PRESENCE] IN SERUM OR PLASMA BY IMMUNOASSAY: NON REACTIVE
TRIOSOMY 16: NOT DETECTED
TRISOMY 22: NOT DETECTED
WBC # BLD AUTO: 8.9 X10E3/UL (ref 3.4–10.8)

## 2024-07-25 NOTE — PROGRESS NOTES
Ok for hub/fo to relay    Lmtcb x2        Notify the patient that her Ghpegsbr30 (fetal chromosome test) was normal.  Ask her if she would like to know the gender of the baby.  If so, you can let her know that the baby is a girl.

## 2024-08-14 ENCOUNTER — ROUTINE PRENATAL (OUTPATIENT)
Dept: OBSTETRICS AND GYNECOLOGY | Facility: CLINIC | Age: 34
End: 2024-08-14
Payer: MEDICAID

## 2024-08-14 VITALS — WEIGHT: 172.4 LBS | DIASTOLIC BLOOD PRESSURE: 72 MMHG | BODY MASS INDEX: 26.22 KG/M2 | SYSTOLIC BLOOD PRESSURE: 110 MMHG

## 2024-08-14 DIAGNOSIS — Z3A.16 16 WEEKS GESTATION OF PREGNANCY: ICD-10-CM

## 2024-08-14 DIAGNOSIS — Z11.3 SCREEN FOR SEXUALLY TRANSMITTED DISEASES: ICD-10-CM

## 2024-08-14 DIAGNOSIS — O09.529 ANTEPARTUM MULTIGRAVIDA OF ADVANCED MATERNAL AGE: Primary | ICD-10-CM

## 2024-08-14 LAB
GLUCOSE UR STRIP-MCNC: NEGATIVE MG/DL
PROT UR STRIP-MCNC: NEGATIVE MG/DL

## 2024-08-14 NOTE — PROGRESS NOTES
Chief complaint: Patient here for routine OB visit.    History of present illness: No major complaints at this time.  She denies contractions, vaginal bleeding, leakage of fluid.  She reports has not yet noted fetal movement.    Objective: See vital signs in the flowsheet  General: No acute distress, awake and oriented x3  Abdomen: Soft, nontender, gravid, fetal heart tones 160  Extremities: No lower extremity edema, no calf tenderness  Psychiatric: Good judgment insight, normal affect and mood  Neurologic: Cranial nerves II through XII intact, no gross deficits    Lab results:  NIPT 46XX  O+, antibody negative, HIV negative, hepatitis B negative, rubella immune, syphilis nonreactive,    Assessment:  1.  34-year-old  8 para 3-0-4-3 at 16-2/7 weeks gestational age 90.  Advanced maternal age    Plan:  1.  Recent lab findings discussed with patient.  Rotations of lab testing and screening explained.  2.  Return to the office in 4 weeks.  Plan ultrasound for anatomy in 4 weeks.  3.  It looks like for what ever reason her gonorrhea and Chlamydia testing was not done last time.  We will resend today.

## 2024-08-16 LAB
C TRACH RRNA SPEC QL NAA+PROBE: NEGATIVE
N GONORRHOEA RRNA SPEC QL NAA+PROBE: NEGATIVE
T VAGINALIS RRNA SPEC QL NAA+PROBE: NEGATIVE

## 2024-08-23 ENCOUNTER — TELEPHONE (OUTPATIENT)
Dept: OBSTETRICS AND GYNECOLOGY | Facility: CLINIC | Age: 34
End: 2024-08-23
Payer: MEDICAID

## 2024-08-23 NOTE — TELEPHONE ENCOUNTER
Error. See previous note.  
High priority message sent to Dr. Torres and called assistant in case they want to get her in today or go to ED.  
I told Dr Torres and he suggested going to Labor and Delivery
Patient notified to go to L&D per Dr. Torres.  
89751 Exp Problem Focused - Mod. Complex

## 2024-09-11 ENCOUNTER — ROUTINE PRENATAL (OUTPATIENT)
Dept: OBSTETRICS AND GYNECOLOGY | Facility: CLINIC | Age: 34
End: 2024-09-11
Payer: MEDICAID

## 2024-09-11 VITALS — SYSTOLIC BLOOD PRESSURE: 110 MMHG | DIASTOLIC BLOOD PRESSURE: 72 MMHG | BODY MASS INDEX: 25.98 KG/M2 | WEIGHT: 170.8 LBS

## 2024-09-11 DIAGNOSIS — O09.529 ANTEPARTUM MULTIGRAVIDA OF ADVANCED MATERNAL AGE: ICD-10-CM

## 2024-09-11 DIAGNOSIS — Z3A.20 20 WEEKS GESTATION OF PREGNANCY: Primary | ICD-10-CM

## 2024-09-11 DIAGNOSIS — O44.40 LOW LYING PLACENTA NOS OR WITHOUT HEMORRHAGE, UNSPECIFIED TRIMESTER: ICD-10-CM

## 2024-09-11 LAB
GLUCOSE UR STRIP-MCNC: NEGATIVE MG/DL
PROT UR STRIP-MCNC: ABNORMAL MG/DL

## 2024-09-11 NOTE — PROGRESS NOTES
Patient has declined the use of  for this appointment.    Chief complaint: Patient here for routine OB visit.    History of present illness: No major complaints at this time.  She denies contractions, vaginal bleeding, leakage of fluid.  She reports active fetal movement.    Objective: See vital signs in the flowsheet  General: No acute distress, awake and oriented x3  Abdomen: Soft, nontender, gravid, fetal heart tones 145, fundal height 20 cm  Extremities: No lower extremity edema, no calf tenderness  Psychiatric: Good judgment insight, normal affect and mood  Neurologic: Cranial nerves II through XII intact, no gross deficits    Ultrasound today:   Appropriate growth for gestational age  Normal anatomic survey, anatomic survey is complete  Normal cervical length  Posterior placenta, low-lying with the leading edge of the cervix 0.9 cm from the internal os  Normal amniotic fluid    Assessment:  1.  34-year-old  8 para 3-0-4-3 at 20-2/7 weeks gestational age  2.  Advanced maternal age  3.  Posterior low-lying placenta    Plan:  1.  Ultrasound findings from today discussed with the patient.  Limitations of ultrasound explained.  Will plan to repeat ultrasound for placentation and growth at 28 weeks.  2.  Return to the office in 4 weeks.

## 2024-10-09 ENCOUNTER — ROUTINE PRENATAL (OUTPATIENT)
Dept: OBSTETRICS AND GYNECOLOGY | Facility: CLINIC | Age: 34
End: 2024-10-09
Payer: MEDICAID

## 2024-10-09 VITALS — WEIGHT: 171 LBS | SYSTOLIC BLOOD PRESSURE: 112 MMHG | DIASTOLIC BLOOD PRESSURE: 72 MMHG | BODY MASS INDEX: 26.01 KG/M2

## 2024-10-09 DIAGNOSIS — O09.529 ANTEPARTUM MULTIGRAVIDA OF ADVANCED MATERNAL AGE: Primary | ICD-10-CM

## 2024-10-09 DIAGNOSIS — Z11.3 SCREEN FOR SEXUALLY TRANSMITTED DISEASES: ICD-10-CM

## 2024-10-09 DIAGNOSIS — O44.40 LOW LYING PLACENTA NOS OR WITHOUT HEMORRHAGE, UNSPECIFIED TRIMESTER: ICD-10-CM

## 2024-10-09 NOTE — PROGRESS NOTES
Chief complaint: Patient here for routine OB visit.    History of present illness: No major complaints at this time.  She denies contractions, vaginal bleeding, leakage of fluid.  She reports active fetal movement.    Objective: See vital signs in the flowsheet  General: No acute distress, awake and oriented x3  Abdomen: Soft, nontender, gravid, fetal heart tones 150, fundal height 24 cm  Extremities: No lower extremity edema, no calf tenderness  Psychiatric: Good judgment insight, normal affect and mood  Neurologic: Cranial nerves II through XII intact, no gross deficits    Assessment:  1.  34-year-old  8 para 3-0-4-3 at 24-2/7 weeks gestational age  2.  Advanced maternal age  3.  Low-lying placenta    Plan:  1.  Patient without complaints today.  Routine prenatal care.  Plan 28-week labs next visit.  Ultrasound next visit for growth and reevaluate placenta.

## 2024-11-08 ENCOUNTER — ROUTINE PRENATAL (OUTPATIENT)
Dept: OBSTETRICS AND GYNECOLOGY | Facility: CLINIC | Age: 34
End: 2024-11-08
Payer: MEDICAID

## 2024-11-08 VITALS — WEIGHT: 175 LBS | SYSTOLIC BLOOD PRESSURE: 117 MMHG | BODY MASS INDEX: 26.61 KG/M2 | DIASTOLIC BLOOD PRESSURE: 79 MMHG

## 2024-11-08 DIAGNOSIS — O99.613 CONSTIPATION DURING PREGNANCY IN THIRD TRIMESTER: ICD-10-CM

## 2024-11-08 DIAGNOSIS — O09.529 ANTEPARTUM MULTIGRAVIDA OF ADVANCED MATERNAL AGE: Primary | ICD-10-CM

## 2024-11-08 DIAGNOSIS — K59.00 CONSTIPATION DURING PREGNANCY IN THIRD TRIMESTER: ICD-10-CM

## 2024-11-08 RX ORDER — DOCUSATE SODIUM 100 MG/1
100 CAPSULE, LIQUID FILLED ORAL DAILY
Qty: 30 CAPSULE | Refills: 5 | Status: SHIPPED | OUTPATIENT
Start: 2024-11-08 | End: 2025-11-08

## 2024-11-08 RX ORDER — POLYETHYLENE GLYCOL 3350 17 G/17G
17 POWDER, FOR SOLUTION ORAL DAILY PRN
Qty: 578 G | Refills: 1 | Status: SHIPPED | OUTPATIENT
Start: 2024-11-08

## 2024-11-08 NOTE — PROGRESS NOTES
Chief complaint: Patient here for routine OB visit.    History of present illness:   Patient reports starting to feel some sharp pelvic pains, especially when she walks or rolls over.  These are intermittent and are very brief.  She is otherwise doing well and is without major complaints at this time.  She denies contractions, vaginal bleeding, leakage of fluid.  She reports active fetal movement.    Objective: See vital signs in the flowsheet  General: No acute distress, awake and oriented x3  Abdomen: Soft, nontender, gravid, fetal heart tones 147, fundal height 30 cm  Extremities: No lower extremity edema, no calf tenderness  Psychiatric: Good judgment insight, normal affect and mood  Neurologic: Cranial nerves II through XII intact, no gross deficits    Ultrasound today:   Appropriate growth for gestational age  Estimated fetal weight is at the 76th percentile with the abdominal circumference at the 69th percentile.  Normal amniotic fluid  Posterior placenta without previa.  Placenta is no longer low-lying  Cephalic presentation    Assessment:  1.  34-year-old  8 para 3-0-4-3 at 28-4/7 weeks gestational age  2.  Advanced maternal age  3.  Constipation  4.  Low-lying placenta, resolved    Plan:  1.  Ultrasound findings in today were discussed with the patient.  Limitations of the ultrasound were explained.  Plan repeat growth every 4 weeks due to advanced maternal age  2.  We discussed use of medications for constipation.  Will start a daily stool softener and she may use MiraLAX as needed  3.  28-week labs today  4.  Return to the office in 2 weeks.

## 2024-11-13 ENCOUNTER — TELEPHONE (OUTPATIENT)
Dept: OBSTETRICS AND GYNECOLOGY | Facility: CLINIC | Age: 34
End: 2024-11-13
Payer: MEDICAID

## 2024-11-13 DIAGNOSIS — O99.810 ABNORMAL GLUCOSE IN PREGNANCY, ANTEPARTUM: Primary | ICD-10-CM

## 2024-11-13 NOTE — TELEPHONE ENCOUNTER
----- Message from Yossi Torres sent at 11/13/2024 10:05 AM EST -----  Please notify the patient that her 1 hour glucose tolerance test was a little bit elevated.  She will need to complete a 3-hour test.  She needs to be fasting for this test and she needs to arrive to the office by 9 AM to start.  It will take 3 hours to complete.

## 2024-11-21 PROBLEM — O24.410 DIET CONTROLLED GESTATIONAL DIABETES MELLITUS (GDM) IN THIRD TRIMESTER: Status: ACTIVE | Noted: 2024-11-21

## 2024-11-22 ENCOUNTER — ROUTINE PRENATAL (OUTPATIENT)
Dept: OBSTETRICS AND GYNECOLOGY | Facility: CLINIC | Age: 34
End: 2024-11-22
Payer: MEDICAID

## 2024-11-22 VITALS — WEIGHT: 181.2 LBS | SYSTOLIC BLOOD PRESSURE: 100 MMHG | BODY MASS INDEX: 27.56 KG/M2 | DIASTOLIC BLOOD PRESSURE: 70 MMHG

## 2024-11-22 DIAGNOSIS — Z3A.30 30 WEEKS GESTATION OF PREGNANCY: Primary | ICD-10-CM

## 2024-11-22 DIAGNOSIS — O24.410 DIET CONTROLLED GESTATIONAL DIABETES MELLITUS (GDM) IN THIRD TRIMESTER: ICD-10-CM

## 2024-11-22 DIAGNOSIS — O09.529 ANTEPARTUM MULTIGRAVIDA OF ADVANCED MATERNAL AGE: ICD-10-CM

## 2024-11-22 LAB
GLUCOSE UR STRIP-MCNC: NEGATIVE MG/DL
PROT UR STRIP-MCNC: ABNORMAL MG/DL

## 2024-11-22 RX ORDER — BLOOD-GLUCOSE METER
KIT MISCELLANEOUS
COMMUNITY
Start: 2024-11-22

## 2024-11-22 NOTE — PROGRESS NOTES
Chief complaint: Patient here for routine OB visit.    History of present illness: No major complaints at this time.  She denies contractions, vaginal bleeding, leakage of fluid.  She reports active fetal movement.    Objective: See vital signs in the flowsheet  General: No acute distress, awake and oriented x3  Abdomen: Soft, nontender, gravid, fetal heart tones 145, fundal height 30 cm  Extremities: No lower extremity edema, no calf tenderness  Psychiatric: Good judgment insight, normal affect and mood  Neurologic: Cranial nerves II through XII intact, no gross deficits    Labs:  Routine Prenatal on 11/22/2024   Component Date Value Ref Range Status    Glucose, UA 11/22/2024 Negative  Negative mg/dL Final    Protein, POC 11/22/2024 1+ (A)  Negative mg/dL Final   Results Encounter on 11/18/2024   Component Date Value Ref Range Status    Glucose - Fasting 11/19/2024 80  70 - 94 mg/dL Final    Glucose - 1 Hour 11/19/2024 178  70 - 179 mg/dL Final    Glucose - 2 Hour 11/19/2024 186 (H)  70 - 154 mg/dL Final    Glucose - 3 Hour 11/19/2024 146 (H)  70 - 139 mg/dL Final    Note: (Reference) 11/19/2024 Comment   Final    Comment: For diagnosis of gestational diabetes, at least two values must meet  or exceed normal limits, which is based on 100 gm of oral glucose  challenge.     Results Encounter on 11/09/2024   Component Date Value Ref Range Status    HIV Screen 4th Gen w/RFX (Referenc* 11/08/2024 Non Reactive  Non Reactive Final    Comment: HIV-1/HIV-2 antibodies and HIV-1 p24 antigen were NOT detected.  There is no laboratory evidence of HIV infection.  HIV Negative      Gestational Diabetes Screen 11/08/2024 140 (H)  70 - 139 mg/dL Final    Comment: According to ADA, a glucose threshold of >139 mg/dL after 50-gram  load identifies approximately 80% of women with gestational  diabetes mellitus, while the sensitivity is further increased to  approximately 90% by a threshold of >129 mg/dL.      Ferritin 11/08/2024 10 (L)   15 - 150 ng/mL Final    T pallidum Antibodies 2024 Non Reactive  Non Reactive Final    WBC 2024 9.5  3.4 - 10.8 x10E3/uL Final    RBC 2024 3.97  3.77 - 5.28 x10E6/uL Final    Hemoglobin 2024 11.6  11.1 - 15.9 g/dL Final    Hematocrit 2024 34.7  34.0 - 46.6 % Final    MCV 2024 87  79 - 97 fL Final    MCH 2024 29.2  26.6 - 33.0 pg Final    MCHC 2024 33.4  31.5 - 35.7 g/dL Final    RDW 2024 12.4  11.7 - 15.4 % Final    Platelets 2024 222  150 - 450 x10E3/uL Final    Neutrophil Rel % 2024 61  Not Estab. % Final    Lymphocyte Rel % 2024 30  Not Estab. % Final    Monocyte Rel % 2024 6  Not Estab. % Final    Eosinophil Rel % 2024 1  Not Estab. % Final    Basophil Rel % 2024 1  Not Estab. % Final    Neutrophils Absolute 2024 5.9  1.4 - 7.0 x10E3/uL Final    Lymphocytes Absolute 2024 2.8  0.7 - 3.1 x10E3/uL Final    Monocytes Absolute 2024 0.5  0.1 - 0.9 x10E3/uL Final    Eosinophils Absolute 2024 0.1  0.0 - 0.4 x10E3/uL Final    Basophils Absolute 2024 0.1  0.0 - 0.2 x10E3/uL Final    Immature Granulocyte Rel % 2024 1  Not Estab. % Final    Immature Grans Absolute 2024 0.1  0.0 - 0.1 x10E3/uL Final         Assessment:  1.  34-year-old  8 para 3-0-4-3 at 30-4/7 weeks gestational age  2.  New diagnosis gestational diabetes  3.  Advanced maternal age    Plan:  1.  We discussed recent lab results consistent with gestational diabetes.  We have discussed how to check her sugars at home.  She is advised to check her sugars 4 times daily, fasting and 2 hours postprandial.  We have discussed dietary modifications that she should make.  We discussed eating a low carbohydrate and high-protein diet.  Patient will be scheduled with follow-up with our nurse practitioner for diabetes education and management.  2.  I would like to see the patient back in 2 weeks.  Plan ultrasound for growth at that  time.  All questions answered.

## 2024-11-27 ENCOUNTER — ROUTINE PRENATAL (OUTPATIENT)
Dept: OBSTETRICS AND GYNECOLOGY | Facility: CLINIC | Age: 34
End: 2024-11-27
Payer: MEDICAID

## 2024-11-27 VITALS — DIASTOLIC BLOOD PRESSURE: 75 MMHG | SYSTOLIC BLOOD PRESSURE: 116 MMHG | WEIGHT: 179 LBS | BODY MASS INDEX: 27.22 KG/M2
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
## 2024-11-27 DIAGNOSIS — O24.410 DIET CONTROLLED GESTATIONAL DIABETES MELLITUS (GDM) IN THIRD TRIMESTER: ICD-10-CM

## 2024-11-27 DIAGNOSIS — Z3A.31 31 WEEKS GESTATION OF PREGNANCY: Primary | ICD-10-CM

## 2024-11-27 DIAGNOSIS — O09.529 ANTEPARTUM MULTIGRAVIDA OF ADVANCED MATERNAL AGE: ICD-10-CM

## 2024-11-27 NOTE — PROGRESS NOTES
Chief Complaint   Patient presents with    Gestational Diabetes     OB follow up     Anila Sepulveda is a 34 y.o.  31w2d being seen today for her obstetrical visit.  Reports she fell 4 days ago. Did not hit her abdomen, fell on her butt. Denies vaginal bleeding or cramping. Fetal movement: normal. She is here today for education on the management of diabetes in pregnancy. Family hx: denies  Prepregnancy wt 162. BMI 27. This is my first time meeting Anila Sepulveda    Review of Systems  Cramping/contractions : denies  Vaginal bleeding: denies  Fetal movement normal    /75   Wt 81.2 kg (179 lb)   LMP  (LMP Unknown) Comment: miscarriage 2024, continued bleeding in 2024; NO BLEEDING IN MARCH  BMI 27.22 kg/m²   Prenatal Assessment  Fetal Heart Rate: 150  Movement: Present     Assessment/Plan  Diagnoses and all orders for this visit:    1. 31 weeks gestation of pregnancy (Primary)    2. Diet controlled gestational diabetes mellitus (GDM) in third trimester    3. Antepartum multigravida of advanced maternal age    4. , initial encounter      S/p fall. She has not had any bleeding or cramping. Rh positive    Reviewed fetal kick counts    Counseled on dietary modifications and restrictions, risks of hyperglycemia on the pregnancy and for , life time risks for type 2 diabetes, discussed frequency of accuchecks. Reviewed goals of fasting 60 - 90 and 2 hour postprandial< 120 throughout pregnancy, S&S of hypoglycemia, corrective action for hypoglycemia, sick day management, fetal kick counts, and exercise. Provided with resources and glucose log. Reviewed use of glucometer. All questions answered appropriately. Encouraged to call office with any further questions. Education material given. Instructed to call the office for glucose >180     [email or web address]-[email or web address]    Disc importance of good glycemic control in pregnancy. Disc risks associated with  poorly or uncontrolled glucose including but not limited to excessive birth weight,  birth, lung immaturity, polyhydramnios,  hypoglycemia, stillbirth, etc.      Pt will send blood glucose log to me weekly through [employer] or via telephone.    Reviewed this stage of pregnancy  Problem list updated   Follow up in 1 week(s) with Dr. Torres    I spent 30   minutes caring for Anila on this date of service. This time includes time spent by me in the following activities: preparing for the visit, reviewing tests, performing a medically appropriate examination and/or evaluation, counseling and educating the patient/family/caregiver, referring and communicating with other health care professionals, documenting information in the medical record, independently interpreting results and communicating that information with the patient/family/caregiver, care coordination, obtaining a separately obtained history, and reviewing a separately obtained history    Naomie Burton, APRN  2024  12:55 EST

## 2024-12-06 ENCOUNTER — ROUTINE PRENATAL (OUTPATIENT)
Dept: OBSTETRICS AND GYNECOLOGY | Facility: CLINIC | Age: 34
End: 2024-12-06
Payer: MEDICAID

## 2024-12-06 VITALS — DIASTOLIC BLOOD PRESSURE: 75 MMHG | WEIGHT: 183.4 LBS | SYSTOLIC BLOOD PRESSURE: 112 MMHG | BODY MASS INDEX: 27.89 KG/M2

## 2024-12-06 DIAGNOSIS — O09.529 ANTEPARTUM MULTIGRAVIDA OF ADVANCED MATERNAL AGE: ICD-10-CM

## 2024-12-06 DIAGNOSIS — O12.13 PROTEINURIA AFFECTING PREGNANCY IN THIRD TRIMESTER: ICD-10-CM

## 2024-12-06 DIAGNOSIS — Z3A.32 32 WEEKS GESTATION OF PREGNANCY: Primary | ICD-10-CM

## 2024-12-06 DIAGNOSIS — O24.410 DIET CONTROLLED GESTATIONAL DIABETES MELLITUS (GDM) IN THIRD TRIMESTER: ICD-10-CM

## 2024-12-06 LAB
GLUCOSE UR STRIP-MCNC: NEGATIVE MG/DL
PROT UR STRIP-MCNC: ABNORMAL MG/DL

## 2024-12-06 RX ORDER — LANCETS 28 GAUGE
1 EACH MISCELLANEOUS 4 TIMES DAILY
Qty: 100 EACH | Refills: 5 | Status: SHIPPED | OUTPATIENT
Start: 2024-12-06

## 2024-12-06 NOTE — PROGRESS NOTES
Chief complaint: Patient here for routine OB visit.    History of present illness: No major complaints at this time.  She denies contractions, vaginal bleeding, leakage of fluid.  She reports active fetal movement.  She did have her appointment with gestational diabetes educator.  She has been checking her sugars at home 4 times daily.    Objective: See vital signs in the flowsheet  General: No acute distress, awake and oriented x3  Abdomen: Soft, nontender, gravid, fetal heart tones 140, fundal height 34 cm  Extremities: No lower extremity edema, no calf tenderness  Psychiatric: Good judgment insight, normal affect and mood  Neurologic: Cranial nerves II through XII intact, no gross deficits    Ultrasound today:   Large for gestational age infant, with the estimated fetal weight at the 83rd percentile and the abdominal circumference at the 93rd percentile  Normal amniotic fluid  Cephalic presentation    Accu-Chek review:  Fastings have mostly been 70-90  Postprandials are more labile.  For the last 3 days her postprandials have all been in normal range, however another day is she has had very labile postprandials ranging from .    Assessment:  1.  34-year-old  8 para 3-0-4-3 at 32-4/7 weeks gestational age  2.  Gestational diabetes, diet-controlled  3.  Advanced maternal age  4.  Large for gestational age infant    Plan:  1.  Ultrasound findings in today were discussed with the patient.  Limitations of ultrasound were explained.  Estimated fetal weight today is at the 80th percentile, but the abdominal circumference is at the 93rd percentile.  We will monitor this.  Start weekly  testing.  2.  Patient's sugars are currently suboptimally controlled, but I do not feel she is doing very well with her diet.  Patient reports she has been eating a type of corn flour porridge.  We had a long conversation today about dietary choices.  Educational handouts were provided for the patient.  Sample diets  were discussed.  She has had improvement in her sugars for the last 3 days, so hopefully this will continue to improve.  Baby is measuring on the bedside today, likely due to gestational diabetes.  Will continue to monitor closely  If there is not improvement, may need to consider the initiation of hypoglycemic agents such as oral agents or insulin.

## 2024-12-07 LAB
CREAT UR-MCNC: 134.5 MG/DL
PROT UR-MCNC: 367.2 MG/DL
PROT/CREAT UR: 2730 MG/G CREAT (ref 0–200)

## 2024-12-08 LAB
BACTERIA UR CULT: NO GROWTH
BACTERIA UR CULT: NORMAL

## 2024-12-09 ENCOUNTER — TELEPHONE (OUTPATIENT)
Dept: OBSTETRICS AND GYNECOLOGY | Facility: CLINIC | Age: 34
End: 2024-12-09
Payer: MEDICAID

## 2024-12-09 PROBLEM — O12.13 PROTEINURIA AFFECTING PREGNANCY IN THIRD TRIMESTER: Status: ACTIVE | Noted: 2024-12-09

## 2024-12-09 NOTE — TELEPHONE ENCOUNTER
----- Message from Yossi Torres sent at 12/9/2024 11:57 AM EST -----  This patient has had a big spike in the protein in her urine.  Please call the patient and see if she can be added to my schedule tomorrow Tuesday 12/10 at [facility].  She would also need a biophysical profile ultrasound.  She is currently scheduled to have an ultrasound and appointment with Naomie on 12/11, but I would rather her come in to see me tomorrow.  That is okay if she has to be overbooked.    Additionally, I want to refer her to maternal-fetal medicine.  I will put the referral in today.  Please notify the patient that someone from the maternal-fetal medicine office will be calling her to schedule an appointment to see them in addition to me.

## 2024-12-10 ENCOUNTER — ROUTINE PRENATAL (OUTPATIENT)
Dept: OBSTETRICS AND GYNECOLOGY | Facility: CLINIC | Age: 34
End: 2024-12-10
Payer: MEDICAID

## 2024-12-10 VITALS — DIASTOLIC BLOOD PRESSURE: 76 MMHG | BODY MASS INDEX: 27.41 KG/M2 | SYSTOLIC BLOOD PRESSURE: 112 MMHG | WEIGHT: 180.2 LBS

## 2024-12-10 DIAGNOSIS — Z3A.33 33 WEEKS GESTATION OF PREGNANCY: Primary | ICD-10-CM

## 2024-12-10 DIAGNOSIS — O24.410 DIET CONTROLLED GESTATIONAL DIABETES MELLITUS (GDM) IN THIRD TRIMESTER: ICD-10-CM

## 2024-12-10 DIAGNOSIS — O12.13 PROTEINURIA AFFECTING PREGNANCY IN THIRD TRIMESTER: ICD-10-CM

## 2024-12-10 DIAGNOSIS — O09.529 ANTEPARTUM MULTIGRAVIDA OF ADVANCED MATERNAL AGE: ICD-10-CM

## 2024-12-10 LAB
GLUCOSE UR STRIP-MCNC: NEGATIVE MG/DL
PROT UR STRIP-MCNC: ABNORMAL MG/DL

## 2024-12-10 NOTE — PROGRESS NOTES
Chief complaint: Prenatal visit  History of present illness: Patient is here for her routine prenatal visit.  Last visit was noticed to have marked proteinuria.  She was advised to come back this week for further evaluation.  She denies headache or visual disturbances.  She reports active fetal movement.  She denies contractions, vaginal bleeding, leakage of fluid.  She denies significant edema.    Patient reports she has been unable to check her sugars since the last visit because she has run out of strips.  She says the pharmacy told her she can get more tomorrow.    Vitals:  Blood pressure today 112/76.  See flowsheet for further vitals  General: No acute distress, awake and oriented x 3  Abdomen: Soft, nontender, gravid, fetal heart tones 143  Extremities: No lower extremity edema, no calf tenderness  Psychiatric: Good judgment insight, normal affect and mood  Neurologic: Cranial nerves II through XII intact    Ultrasound today:  Biophysical profile   Normal amniotic fluid  Cephalic presentation    Routine Prenatal on 12/10/2024   Component Date Value Ref Range Status    Glucose, UA 12/10/2024 Negative  Negative mg/dL Final    Protein, POC 12/10/2024 2000 mg/dL (A)  Negative mg/dL Final   Routine Prenatal on 2024   Component Date Value Ref Range Status    Glucose, UA 2024 Negative  Negative mg/dL Final    Protein, POC 2024 2000 mg/dL (A)  Negative mg/dL Final    Urine Culture 2024 Final report   Final    Result 1 2024 No growth   Final    Creatinine, Urine 2024 134.5  Not Estab. mg/dL Final    Total Protein, Urine 2024 367.2  Not Estab. mg/dL Final    Comment: Results confirmed on  dilution.      Protein/Creatinine Ratio 2024 2,730 (H)  0 - 200 mg/g creat Final     Assessment:  1.  34-year-old  8 para 3-0-4-3 at 33 and 1 sevenths weeks gestational age  2.  New onset proteinuria, without evidence of preeclampsia  3.  Gestational diabetes, diet  controlled  4.  Advanced maternal age    Plan:  1.  Ultrasound findings from today were discussed with the patient.  Limitations of ultrasound were explained.  2.  We discussed the significance of her marked proteinuria.  Her blood pressures have been normal throughout, so not clearly preeclampsia.  Will get preeclampsia lab panel today with CBC, CMP, LDH, uric acid.  Patient is given instructions to obtain 24-hour urine protein collection.  I have scheduled the patient an appointment with maternal-fetal medicine for next week on 12/18.  She was provided information on the date, time, and location of the [facility] office and the importance of follow-up.  3.  Patient is encouraged to continue checking her sugars once she is able to get more test trips tomorrow.  Continue dietary modifications.  Further GDM management will be with maternal-fetal medicine.

## 2024-12-11 LAB
ALBUMIN SERPL-MCNC: 3.5 G/DL (ref 3.9–4.9)
ALP SERPL-CCNC: 110 IU/L (ref 44–121)
ALT SERPL-CCNC: 16 IU/L (ref 0–32)
AST SERPL-CCNC: 20 IU/L (ref 0–40)
BILIRUB SERPL-MCNC: <0.2 MG/DL (ref 0–1.2)
BUN SERPL-MCNC: 6 MG/DL (ref 6–20)
BUN/CREAT SERPL: 16 (ref 9–23)
CALCIUM SERPL-MCNC: 8.9 MG/DL (ref 8.7–10.2)
CHLORIDE SERPL-SCNC: 105 MMOL/L (ref 96–106)
CO2 SERPL-SCNC: 15 MMOL/L (ref 20–29)
CREAT SERPL-MCNC: 0.37 MG/DL (ref 0.57–1)
EGFRCR SERPLBLD CKD-EPI 2021: 135 ML/MIN/1.73
ERYTHROCYTE [DISTWIDTH] IN BLOOD BY AUTOMATED COUNT: 13 % (ref 11.7–15.4)
GLOBULIN SER CALC-MCNC: 2.8 G/DL (ref 1.5–4.5)
GLUCOSE SERPL-MCNC: 165 MG/DL (ref 70–99)
HCT VFR BLD AUTO: 33.7 % (ref 34–46.6)
HGB BLD-MCNC: 11.3 G/DL (ref 11.1–15.9)
LDH SERPL L TO P-CCNC: 189 IU/L (ref 119–226)
MCH RBC QN AUTO: 29.3 PG (ref 26.6–33)
MCHC RBC AUTO-ENTMCNC: 33.5 G/DL (ref 31.5–35.7)
MCV RBC AUTO: 87 FL (ref 79–97)
PLATELET # BLD AUTO: 224 X10E3/UL (ref 150–450)
POTASSIUM SERPL-SCNC: 3.7 MMOL/L (ref 3.5–5.2)
PROT SERPL-MCNC: 6.3 G/DL (ref 6–8.5)
RBC # BLD AUTO: 3.86 X10E6/UL (ref 3.77–5.28)
SODIUM SERPL-SCNC: 137 MMOL/L (ref 134–144)
URATE SERPL-MCNC: 5.3 MG/DL (ref 2.6–6.2)
WBC # BLD AUTO: 8.5 X10E3/UL (ref 3.4–10.8)

## 2024-12-12 ENCOUNTER — TELEPHONE (OUTPATIENT)
Dept: OBSTETRICS AND GYNECOLOGY | Facility: CLINIC | Age: 34
End: 2024-12-12
Payer: MEDICAID

## 2024-12-12 NOTE — TELEPHONE ENCOUNTER
Call placed to Anila using [employer]  ID #[id number] to review MFM diabetes management. Pt reports she is currently checking her blood sugars 4 times a day: fasting and 2 hours after eating. Discussed with pt MFM management and checking blood sugars 7 times a day: before meals, one hour after meals and before bedtime snack. Pt agreeable to this adjustment.     Discussed carb goals and eating every 2-3 hours with 30g carbs for breakfast, 45g carbs for lunch and dinner and 10-15g carbs with snacks in between meals and at bedtime. Pt verbalized understanding. Glucose goals of 60-90 before meals and  one hour after meals were reviewed. Discussed with patient that if glucose values are consistently outside of the targeted range, MFM will discuss starting her on medications. Pt not on medications for blood sugars at this time. Encouraged pt to start checking blood sugars 7 times a day and bring glucose logs to her upcoming appointment. Anila verbalized understanding.     Patient does not have [employer] set up so a email was sent to patient with MFM glucose log attached. Patient provided MFM with her email over the phone:  [email or web address]. Will provide patient with MFM folder with diabetes packet, hypoglycemia protocol and extra glucose logs at time of appointment. All questions answered at time of call and encouraged patient to call this RN with any additional questions prior to her appointment. MFM looks forward to seeing pt on 12/18 at 10.

## 2024-12-13 ENCOUNTER — TRANSCRIBE ORDERS (OUTPATIENT)
Dept: ULTRASOUND IMAGING | Facility: HOSPITAL | Age: 34
End: 2024-12-13
Payer: MEDICAID

## 2024-12-13 DIAGNOSIS — O09.529 AMA (ADVANCED MATERNAL AGE) MULTIGRAVIDA 35+, UNSPECIFIED TRIMESTER: ICD-10-CM

## 2024-12-13 DIAGNOSIS — O24.419 GESTATIONAL DIABETES MELLITUS (GDM), ANTEPARTUM, GESTATIONAL DIABETES METHOD OF CONTROL UNSPECIFIED: ICD-10-CM

## 2024-12-13 DIAGNOSIS — O12.13 PROTEINURIA AFFECTING PREGNANCY IN THIRD TRIMESTER: Primary | ICD-10-CM

## 2024-12-18 ENCOUNTER — LAB (OUTPATIENT)
Dept: LAB | Facility: HOSPITAL | Age: 34
End: 2024-12-18
Payer: MEDICAID

## 2024-12-18 ENCOUNTER — OFFICE VISIT (OUTPATIENT)
Dept: OBSTETRICS AND GYNECOLOGY | Facility: CLINIC | Age: 34
End: 2024-12-18
Payer: MEDICAID

## 2024-12-18 ENCOUNTER — HOSPITAL ENCOUNTER (OUTPATIENT)
Dept: ULTRASOUND IMAGING | Facility: HOSPITAL | Age: 34
Discharge: HOME | End: 2024-12-18
Payer: MEDICAID

## 2024-12-18 VITALS — SYSTOLIC BLOOD PRESSURE: 111 MMHG | DIASTOLIC BLOOD PRESSURE: 66 MMHG | HEART RATE: 107 BPM | TEMPERATURE: 98 F

## 2024-12-18 DIAGNOSIS — Z75.8: ICD-10-CM

## 2024-12-18 DIAGNOSIS — O12.13 PROTEINURIA AFFECTING PREGNANCY IN THIRD TRIMESTER: ICD-10-CM

## 2024-12-18 DIAGNOSIS — O09.529 ANTEPARTUM MULTIGRAVIDA OF ADVANCED MATERNAL AGE: Primary | ICD-10-CM

## 2024-12-18 DIAGNOSIS — O09.529 AMA (ADVANCED MATERNAL AGE) MULTIGRAVIDA 35+, UNSPECIFIED TRIMESTER: ICD-10-CM

## 2024-12-18 DIAGNOSIS — O24.410 DIET CONTROLLED GESTATIONAL DIABETES MELLITUS (GDM) IN THIRD TRIMESTER: ICD-10-CM

## 2024-12-18 DIAGNOSIS — O24.419 GESTATIONAL DIABETES MELLITUS (GDM), ANTEPARTUM, GESTATIONAL DIABETES METHOD OF CONTROL UNSPECIFIED: ICD-10-CM

## 2024-12-18 PROBLEM — U07.1 COVID-19: Status: RESOLVED | Noted: 2024-12-18 | Resolved: 2024-12-18

## 2024-12-18 PROBLEM — N64.4 BILATERAL MASTODYNIA: Status: RESOLVED | Noted: 2018-04-17 | Resolved: 2024-12-18

## 2024-12-18 PROBLEM — O03.9 COMPLETE SPONTANEOUS ABORTION WITHOUT COMPLICATION: Status: RESOLVED | Noted: 2024-01-22 | Resolved: 2024-12-18

## 2024-12-18 LAB
ALBUMIN SERPL-MCNC: 3.4 G/DL (ref 3.5–5.2)
ALBUMIN/GLOB SERPL: 1 G/DL
ALP SERPL-CCNC: 114 U/L (ref 39–117)
ALT SERPL W P-5'-P-CCNC: 14 U/L (ref 1–33)
ANION GAP SERPL CALCULATED.3IONS-SCNC: 10.9 MMOL/L (ref 5–15)
AST SERPL-CCNC: 19 U/L (ref 1–32)
BILIRUB SERPL-MCNC: <0.2 MG/DL (ref 0–1.2)
BUN SERPL-MCNC: 9 MG/DL (ref 6–20)
BUN/CREAT SERPL: 19.6 (ref 7–25)
CALCIUM SPEC-SCNC: 8.6 MG/DL (ref 8.6–10.5)
CHLORIDE SERPL-SCNC: 104 MMOL/L (ref 98–107)
CO2 SERPL-SCNC: 20.1 MMOL/L (ref 22–29)
CREAT SERPL-MCNC: 0.46 MG/DL (ref 0.57–1)
CREAT UR-MCNC: 46.9 MG/DL
DEPRECATED RDW RBC AUTO: 41 FL (ref 37–54)
EGFRCR SERPLBLD CKD-EPI 2021: 128.2 ML/MIN/1.73
ERYTHROCYTE [DISTWIDTH] IN BLOOD BY AUTOMATED COUNT: 13 % (ref 12.3–15.4)
GLOBULIN UR ELPH-MCNC: 3.5 GM/DL
GLUCOSE BLDC GLUCOMTR-MCNC: 117 MG/DL (ref 70–130)
GLUCOSE SERPL-MCNC: 79 MG/DL (ref 65–99)
HBA1C MFR BLD: 5.4 % (ref 4.8–5.6)
HCT VFR BLD AUTO: 34.7 % (ref 34–46.6)
HGB BLD-MCNC: 11.3 G/DL (ref 12–15.9)
MCH RBC QN AUTO: 28.2 PG (ref 26.6–33)
MCHC RBC AUTO-ENTMCNC: 32.6 G/DL (ref 31.5–35.7)
MCV RBC AUTO: 86.5 FL (ref 79–97)
PLATELET # BLD AUTO: 245 10*3/MM3 (ref 140–450)
PMV BLD AUTO: 11.9 FL (ref 6–12)
POTASSIUM SERPL-SCNC: 4.2 MMOL/L (ref 3.5–5.2)
PROT ?TM UR-MCNC: 116.3 MG/DL
PROT SERPL-MCNC: 6.9 G/DL (ref 6–8.5)
PROT/CREAT UR: 2479.7 MG/G CREA (ref 0–200)
RBC # BLD AUTO: 4.01 10*6/MM3 (ref 3.77–5.28)
SODIUM SERPL-SCNC: 135 MMOL/L (ref 136–145)
WBC NRBC COR # BLD AUTO: 10.25 10*3/MM3 (ref 3.4–10.8)

## 2024-12-18 PROCEDURE — 36415 COLL VENOUS BLD VENIPUNCTURE: CPT

## 2024-12-18 PROCEDURE — 80053 COMPREHEN METABOLIC PANEL: CPT

## 2024-12-18 PROCEDURE — 76811 OB US DETAILED SNGL FETUS: CPT

## 2024-12-18 PROCEDURE — 83036 HEMOGLOBIN GLYCOSYLATED A1C: CPT

## 2024-12-18 PROCEDURE — 85027 COMPLETE CBC AUTOMATED: CPT

## 2024-12-18 PROCEDURE — 82570 ASSAY OF URINE CREATININE: CPT

## 2024-12-18 PROCEDURE — 76819 FETAL BIOPHYS PROFIL W/O NST: CPT

## 2024-12-18 PROCEDURE — 82948 REAGENT STRIP/BLOOD GLUCOSE: CPT | Performed by: NURSE PRACTITIONER

## 2024-12-18 PROCEDURE — 84156 ASSAY OF PROTEIN URINE: CPT

## 2024-12-18 RX ORDER — METFORMIN HYDROCHLORIDE 500 MG/1
500 TABLET, EXTENDED RELEASE ORAL 2 TIMES DAILY WITH MEALS
Qty: 60 TABLET | Refills: 5 | Status: SHIPPED | OUTPATIENT
Start: 2024-12-18

## 2024-12-18 NOTE — PROGRESS NOTES
Pt reports that she is doing well and denies vaginal bleeding, cramping, contractions or LOF at this time. Reports active fetal movement. Reviewed when to call OB office or present to L&D for evaluation with symptoms such as decreased fetal movement, vaginal bleeding, LOF or ctxs. Pt verbalized understanding. Denies HA, visual changes or epigastric pain. Denies any additional complaints at time of appointment. Next OB appointment scheduled for 12/27.    Blood sugar logs on chart for [facility] to review. Blood sugar taken at time of appointment with value of 117. [facility] diabetic education packet reviewed with patient.     Vitals:    12/18/24 1021   BP: 111/66   Pulse: 107   Temp: 98 °F (36.7 °C)

## 2024-12-18 NOTE — LETTER
2024     Yossi Torres MD  [address]  [address]  [place] [id number]    Patient: Anila Sepulveda   YOB: 1990   Date of Visit: 2024       Dear Yossi Torres MD,    Thank you for referring Anila Sepulveda to me for evaluation. Below is a copy of my consult note.    If you have questions, please do not hesitate to call me. I look forward to following Anila along with you.         Sincerely,        Brittaney Mon, JAN        CC: No Recipients                            Pt reports that she is doing well and denies vaginal bleeding, cramping, contractions or LOF at this time. Reports active fetal movement. Reviewed when to call OB office or present to L&D for evaluation with symptoms such as decreased fetal movement, vaginal bleeding, LOF or ctxs. Pt verbalized understanding. Denies HA, visual changes or epigastric pain. Denies any additional complaints at time of appointment. Next OB appointment scheduled for .    Blood sugar logs on chart for MFM to review. Blood sugar taken at time of appointment with value of 117. MFM diabetic education packet reviewed with patient.     Vitals:    24 1021   BP: 111/66   Pulse: 107   Temp: 98 °F (36.7 °C)         MATERNAL FETAL MEDICINE CONSULT NOTE    Dear Dr Yossi Ngo*:    Thank you for your kind referral of Anila Sepulveda.  As you know, she is a 34 y.o.   34w2d gestation (Estimated Date of Delivery: 25). This is a consult.     Her antepartum course is complicated by:  GDM  AMA  Proteinuria      Aneuploidy Screening: Low Risk  [id number] PLUS Core+SCA+ESS - Blood, (2024 09:39)   Carrier Screening: none seen    HPI: Today, denies contractions, LOF, vaginal bleeding. Reports normal fetal movement.   She denies headache, vision changes, shortness of breath, acute changes in edema, and RUQ pain.     Review of History:  Past Medical History:   Diagnosis Date   • Bilateral  mastodynia 2018   • Complete spontaneous  without complication 2024:  - LMP 10/28/2023  - ED visit 2024 with bleeding for 2 days. BSUS showed GS, no definitive YS. Thickened endometrium.   - Quant 1204 on 24  - Patient report that since her ED visit, she has passed tissue. Showed pics during clinic visit today.  - Bleeding is now minimal with light cramping.    - PNVs sent to pharmacy since pt wishes to get pregnant again. Advised to wait for one m   • COVID-19 2024   • Irregular uterine bleeding    • Spontaneous miscarriage 2024     Past Surgical History:   Procedure Laterality Date   • D & C HYSTEROSCOPY N/A 3/28/2024    Procedure: DILATATION AND CURETTAGE HYSTEROSCOPY WITH MYOSURE RESECTION OF UTERINE MASS;  Surgeon: Yossi Torres MD;  [facility] MAIN OR;  Service: Obstetrics/Gynecology;  Laterality: N/A;   • HYSTEROSCOPY         OB History    Para Term  AB Living   8 0 0 0 4 3   SAB IAB Ectopic Molar Multiple Live Births   1 3 0 0 0 3      # Outcome Date GA Lbr Nolan/2nd Weight Sex Type Anes PTL Lv   8 Current            7 SAB 24 7w0d          6      M Vag-Spont   SINDY   5      F Vag-Spont   SINDY   4      F Vag-Spont   SINDY   3 IAB            2 IAB            1 IAB              Social History     Socioeconomic History   • Marital status: Single   Tobacco Use   • Smoking status: Never   • Smokeless tobacco: Never   Vaping Use   • Vaping status: Never Used   Substance and Sexual Activity   • Alcohol use: Yes     Comment: socially   • Drug use: Never   • Sexual activity: Yes     Partners: Male     Family History   Problem Relation Age of Onset   • Breast cancer Neg Hx    • Ovarian cancer Neg Hx    • Uterine cancer Neg Hx    • Colon cancer Neg Hx    • Malig Hyperthermia Neg Hx       No Known Allergies   Current Outpatient Medications on File Prior to Visit   Medication Sig Dispense Refill   • Blood  "Glucose Monitoring Suppl (FreeStyle Lite) w/Device kit      • glucose blood test strip 1 each by Other route 4 (Four) Times a Day. 100 each 5   • Lancets (freestyle) lancets 1 each by Other route 4 (Four) Times a Day. 100 each 5   • [DISCONTINUED] Prenatal Vit-Fe Fumarate-FA (Prenatal Vitamin) 27-0.8 MG tablet Take 1 tablet by mouth Daily. (Patient not taking: Reported on 12/18/2024) 90 tablet 3     No current facility-administered medications on file prior to visit.        Common labs          7/17/2024    09:39 11/8/2024    12:27 12/10/2024    14:53   Common Labs   Glucose   165    BUN   6    Creatinine   0.37    Sodium   137    Potassium   3.7    Chloride   105    Calcium   8.9    Total Protein   6.3    Albumin   3.5    Total Bilirubin   <0.2    Alkaline Phosphatase   110    AST (SGOT)   20    ALT (SGPT)   16    WBC 8.9  9.5  8.5    Hemoglobin 12.2  11.6  11.3    Hematocrit 36.2  34.7  33.7    Platelets 337  222  224    Uric Acid   5.3          Past obstetric, gynecological, medical, surgical, family and social history reviewed.  Relevant lab work and imaging reviewed.    Review of systems  Constitutional:  denies fever, chills, malaise.   ENT/Mouth:  denies sore throat, tinnitus  Eyes: denies vision changes/pain  CV:  denies chest pain  Respiratory:  denies cough/SOB  GI:  denies N/V, diarrhea, abdominal pain.    :   denies dysuria  Skin:  denies lesions or pruritus   Neuro:  denies weakness, focal neurologic symptoms    Vitals:    12/18/24 1021   BP: 111/66   BP Location: Right arm   Patient Position: Sitting   Pulse: 107   Temp: 98 °F (36.7 °C)   TempSrc: Temporal       Estimated body mass index is 27.41 kg/m² as calculated from the following:    Height as of 4/10/24: 172.7 cm (67.99\").    Weight as of 12/10/24: 81.7 kg (180 lb 3.2 oz).    PHYSICAL EXAM   General: No acute distress, pleasant, AAO x 3  Respiratory: No increased work of breathing, speaking in complete sentences without difficulty, symmetric " chest rise  Cardiac: Regular rate   abdominal: Gravid, nontender  Extremities: No significant edema  Neuro/psych: Awake, Alert and oriented, appropriate mood/affect      ULTRASOUND   A full ultrasound report can be found under imaging tab          ASSESSMENT/COUNSELIN y.o.   34w2d gestation (Estimated Date of Delivery: 25).    -Pregnancy  [ X ] stable  [   ] improving [  ] worsening    Diagnoses and all orders for this visit:    1. Antepartum multigravida of advanced maternal age (Primary)    2. Proteinuria affecting pregnancy in third trimester  -     Ambulatory Referral to Nephrology  -     CBC (No Diff); Future  -     Comprehensive Metabolic Panel; Future  -     Protein / Creatinine Ratio, Urine - Urine, Clean Catch; Future  -     Hemoglobin A1c; Future  -     Blood Pressure Monitoring (Blood Pressure Monitor Automat) device; Use to check blood pressure twice daily  Dispense: 1 each; Refill: 0    3. Diet controlled gestational diabetes mellitus (GDM) in third trimester  -     POC Glucose    4. [facility]  needed    Other orders  -     metFORMIN ER (GLUCOPHAGE-XR) 500 MG 24 hr tablet; Take 1 tablet by mouth 2 (Two) Times a Day With Meals.  Dispense: 60 tablet; Refill: 5         GESTATIONAL DIABETES  Counseling: I explained to her that patients with diabetes have a variety of pregnancy related risks that are related to their level of glycemic control.  We discussed  the increased risks related to diabetes including but not limited to congenital anomalies, fetal growth disorders (FGR or LGA), indicated or spontaneous  birth, macrosomia, polyhydramnios, birth trauma,  complications (jaundice, hypoglycemia, NICU admit) and stillbirth with uncontrolled glucose.       We reviewed the results of her glycemic profiling, which reveals okay but suboptimal control with diet control.    She denies lows.      New regimen:  Metformin 500 mg XR BID      We discussed diet  recommendations, and goal pre-prandial values below 90 and 1-hour post-prandial values below 120 to optimize her pregnancy outcome as many studies have demonstrated that this is the normal range for pregnant women without diabetes.We discussed meal timing and eating every 2-3 hours with 30g carbs at breakfast, 45 for lunch and dinner, and 10-15g snacks in between and at bedtime.  We also discussed insulin teaching and hypoglycemia management and to call with BG <60 or >200.     ADVANCED MATERNAL AGE  The patient's age related risk for aneuploidy was reviewed. Noninvasive prenatal screening with cell-free fetal DNA (NIPT) results reviewed, which were normal.    Advanced maternal age has been associated with placental insufficiency with increased risk of fetal growth disorder and hypertensive disorders. Recommend fetal growth surveillance. Start  fetal surveillance with weekly BPP at 32-36 weeks.  Recommend baby ASA, which she is taking.        PROTEINURIA  Component      Latest Ref Rng 2024 2024 2024 2024 2024 12/10/2024   Protein, UA      Negative mg/dL Negative  Negative  Trace !  1+ !  2000 mg/dL !  2000 mg/dL !       Component      Latest Ref Rng 2024   Creatinine, Urine      Not Estab. mg/dL 134.5    Total Protein, Urine      Not Estab. mg/dL 367.2    Protein/Creatinine Ratio      0 - 200 mg/g creat 2,730 (H)       Onset of proteinuria from primary ob UA results indicates timing right around 20-21 weeks  Stat referral for Nephrology ordered  Recommend patient to check blood pressure at home twice daily.  Labs and repeat PCR ordered today.   Monitor closely for pre-eclampsia.   Sent in rx for auto blood pressure monitor and requested that patient check her blood pressure at home twice daily.  She denies headache, vision changes, RUQ pain, new onset N/V, etc.   She is aware to monitor for signs and symptoms or pre-eclampsia.  She is aware to report to EDMAR for BP elevated >  140/>90 x 2     USED  Entirety of today's encounter including patient interview, exam, and counseling performed with the aid of a medical Shailesh  via the telephone.          Summary of Plan  - Recommend emailing/fax/submitting her log weekly for review; to bring log to all [facility] office visits.  -Continue ADA diet with carbohydrate counting.    -Continue glucose monitoring 7 times daily as instructed.  -Ambulatory blood pressure monitoring twice daily  -Monitor for signs/symptoms of pre-eclampsia  -Stat referral to nephrology  - Medications as above.  -Serial growth ultrasounds every 4 weeks ([facility])  -Weekly fetal  surveillance until delivery (Alternate between [facility] and Primary OB)  -Starting at 28 weeks: Fetal movement instructions given continue daily until delivery; instructed to report to labor and delivery if cannot achieve more than 10 kicks in one hour or if she perceives a decrease in fetal movement  -Continue routine prenatal care with primary ob team   -Recommend delivery at 39 weeks unless indicated sooner  -If estimated fetal weight >4500gm at time of delivery, would recommend  delivery counseling   -Intrapartum glucose monitoring is recommended, with a goal of .       Follow-up: 2 weeks for BPP    Thank you for the consult and opportunity to care for this patient.  Please feel free to reach out with any questions or concerns.      I spent 40 minutes caring for this patient on this date of service. This time includes time spent by me in the following activities: preparing for the visit, reviewing tests, obtaining and/or reviewing a separately obtained history, performing a medically appropriate examination and/or evaluation, counseling and educating the patient/family/caregiver and independently interpreting results and communicating that information with the patient/family/caregiver with greater than 50% spent in counseling and coordination of care.     Brittaney  JAN Mon  12/18/2024  Maternal Fetal Medicine-[facility]  Office: [phone]  [email or web address]

## 2024-12-18 NOTE — PROGRESS NOTES
MATERNAL FETAL MEDICINE CONSULT NOTE    Dear Dr Yossi Ngo*:    Thank you for your kind referral of Anila Sepulveda.  As you know, she is a 34 y.o.   34w2d gestation (Estimated Date of Delivery: 25). This is a consult.     Her antepartum course is complicated by:  GDM  AMA  Proteinuria      Aneuploidy Screening: Low Risk  [id number] PLUS Core+SCA+ESS - Blood, (2024 09:39)   Carrier Screening: none seen    HPI: Today, denies contractions, LOF, vaginal bleeding. Reports normal fetal movement.   She denies headache, vision changes, shortness of breath, acute changes in edema, and RUQ pain.     Review of History:  Past Medical History:   Diagnosis Date    Bilateral mastodynia 2018    Complete spontaneous  without complication 2024:  - LMP 10/28/2023  - ED visit 2024 with bleeding for 2 days. BSUS showed GS, no definitive YS. Thickened endometrium.   - Quant 1204 on 24  - Patient report that since her ED visit, she has passed tissue. Showed pics during clinic visit today.  - Bleeding is now minimal with light cramping.    - PNVs sent to pharmacy since pt wishes to get pregnant again. Advised to wait for one m    COVID-19 2024    Irregular uterine bleeding     Spontaneous miscarriage 2024     Past Surgical History:   Procedure Laterality Date    D & C HYSTEROSCOPY N/A 3/28/2024    Procedure: DILATATION AND CURETTAGE HYSTEROSCOPY WITH MYOSURE RESECTION OF UTERINE MASS;  Surgeon: Yossi Torres MD;  Location: [facility] OR;  Service: Obstetrics/Gynecology;  Laterality: N/A;    HYSTEROSCOPY         OB History    Para Term  AB Living   8 0 0 0 4 3   SAB IAB Ectopic Molar Multiple Live Births   1 3 0 0 0 3      # Outcome Date GA Lbr Nolan/2nd Weight Sex Type Anes PTL Lv   8 Current            7 SAB 24 7w0d          6  2009    M Vag-Spont   SINDY   5      F Vag-Spont   SINDY   4      F Vag-Spont    SINDY   3 IAB            2 IAB            1 IAB              Social History     Socioeconomic History    Marital status: Single   Tobacco Use    Smoking status: Never    Smokeless tobacco: Never   Vaping Use    Vaping status: Never Used   Substance and Sexual Activity    Alcohol use: Yes     Comment: socially    Drug use: Never    Sexual activity: Yes     Partners: Male     Family History   Problem Relation Age of Onset    Breast cancer Neg Hx     Ovarian cancer Neg Hx     Uterine cancer Neg Hx     Colon cancer Neg Hx     Malig Hyperthermia Neg Hx       No Known Allergies   Current Outpatient Medications on File Prior to Visit   Medication Sig Dispense Refill    Blood Glucose Monitoring Suppl (FreeStyle Lite) w/Device kit       glucose blood test strip 1 each by Other route 4 (Four) Times a Day. 100 each 5    Lancets (freestyle) lancets 1 each by Other route 4 (Four) Times a Day. 100 each 5    [DISCONTINUED] Prenatal Vit-Fe Fumarate-FA (Prenatal Vitamin) 27-0.8 MG tablet Take 1 tablet by mouth Daily. (Patient not taking: Reported on 12/18/2024) 90 tablet 3     No current facility-administered medications on file prior to visit.        Common labs          7/17/2024    09:39 11/8/2024    12:27 12/10/2024    14:53   Common Labs   Glucose   165    BUN   6    Creatinine   0.37    Sodium   137    Potassium   3.7    Chloride   105    Calcium   8.9    Total Protein   6.3    Albumin   3.5    Total Bilirubin   <0.2    Alkaline Phosphatase   110    AST (SGOT)   20    ALT (SGPT)   16    WBC 8.9  9.5  8.5    Hemoglobin 12.2  11.6  11.3    Hematocrit 36.2  34.7  33.7    Platelets 337  222  224    Uric Acid   5.3          Past obstetric, gynecological, medical, surgical, family and social history reviewed.  Relevant lab work and imaging reviewed.    Review of systems  Constitutional:  denies fever, chills, malaise.   ENT/Mouth:  denies sore throat, tinnitus  Eyes: denies vision changes/pain  CV:  denies chest pain  Respiratory:   "denies cough/SOB  GI:  denies N/V, diarrhea, abdominal pain.    :   denies dysuria  Skin:  denies lesions or pruritus   Neuro:  denies weakness, focal neurologic symptoms    Vitals:    24 1021   BP: 111/66   BP Location: Right arm   Patient Position: Sitting   Pulse: 107   Temp: 98 °F (36.7 °C)   TempSrc: Temporal       Estimated body mass index is 27.41 kg/m² as calculated from the following:    Height as of 4/10/24: 172.7 cm (67.99\").    Weight as of 12/10/24: 81.7 kg (180 lb 3.2 oz).    PHYSICAL EXAM   General: No acute distress, pleasant, AAO x 3  Respiratory: No increased work of breathing, speaking in complete sentences without difficulty, symmetric chest rise  Cardiac: Regular rate   abdominal: Gravid, nontender  Extremities: No significant edema  Neuro/psych: Awake, Alert and oriented, appropriate mood/affect      ULTRASOUND   A full ultrasound report can be found under imaging tab          ASSESSMENT/COUNSELIN y.o.   34w2d gestation (Estimated Date of Delivery: 25).    -Pregnancy  [ X ] stable  [   ] improving [  ] worsening    Diagnoses and all orders for this visit:    1. Antepartum multigravida of advanced maternal age (Primary)    2. Proteinuria affecting pregnancy in third trimester  -     Ambulatory Referral to Nephrology  -     CBC (No Diff); Future  -     Comprehensive Metabolic Panel; Future  -     Protein / Creatinine Ratio, Urine - Urine, Clean Catch; Future  -     Hemoglobin A1c; Future  -     Blood Pressure Monitoring (Blood Pressure Monitor Automat) device; Use to check blood pressure twice daily  Dispense: 1 each; Refill: 0    3. Diet controlled gestational diabetes mellitus (GDM) in third trimester  -     POC Glucose    4. [facility]  needed    Other orders  -     metFORMIN ER (GLUCOPHAGE-XR) 500 MG 24 hr tablet; Take 1 tablet by mouth 2 (Two) Times a Day With Meals.  Dispense: 60 tablet; Refill: 5         GESTATIONAL DIABETES  Counseling: I " explained to her that patients with diabetes have a variety of pregnancy related risks that are related to their level of glycemic control.  We discussed  the increased risks related to diabetes including but not limited to congenital anomalies, fetal growth disorders (FGR or LGA), indicated or spontaneous  birth, macrosomia, polyhydramnios, birth trauma,  complications (jaundice, hypoglycemia, NICU admit) and stillbirth with uncontrolled glucose.       We reviewed the results of her glycemic profiling, which reveals okay but suboptimal control with diet control.    She denies lows.      New regimen:  Metformin 500 mg XR BID      We discussed diet recommendations, and goal pre-prandial values below 90 and 1-hour post-prandial values below 120 to optimize her pregnancy outcome as many studies have demonstrated that this is the normal range for pregnant women without diabetes.We discussed meal timing and eating every 2-3 hours with 30g carbs at breakfast, 45 for lunch and dinner, and 10-15g snacks in between and at bedtime.  We also discussed insulin teaching and hypoglycemia management and to call with BG <60 or >200.     ADVANCED MATERNAL AGE  The patient's age related risk for aneuploidy was reviewed. Noninvasive prenatal screening with cell-free fetal DNA (NIPT) results reviewed, which were normal.    Advanced maternal age has been associated with placental insufficiency with increased risk of fetal growth disorder and hypertensive disorders. Recommend fetal growth surveillance. Start  fetal surveillance with weekly BPP at 32-36 weeks.  Recommend baby ASA, which she is taking.        PROTEINURIA  Component      Latest Ref Rng 2024 2024 2024 2024 2024 12/10/2024   Protein, UA      Negative mg/dL Negative  Negative  Trace !  1+ !  2000 mg/dL !  2000 mg/dL !       Component      Latest Ref Rng 2024   Creatinine, Urine      Not Estab. mg/dL 134.5    Total Protein,  Urine      Not Estab. mg/dL 367.2    Protein/Creatinine Ratio      0 - 200 mg/g creat 2,730 (H)       Onset of proteinuria from primary ob UA results indicates timing right around 20-21 weeks  Stat referral for Nephrology ordered  Recommend patient to check blood pressure at home twice daily.  Labs and repeat PCR ordered today.   Monitor closely for pre-eclampsia.   Sent in rx for auto blood pressure monitor and requested that patient check her blood pressure at home twice daily.  She denies headache, vision changes, RUQ pain, new onset N/V, etc.   She is aware to monitor for signs and symptoms or pre-eclampsia.  She is aware to report to EDMAR for BP elevated > 140/>90 x 2     USED  Entirety of today's encounter including patient interview, exam, and counseling performed with the aid of a medical Shailesh  via the telephone.          Summary of Plan  - Recommend emailing/fax/submitting her log weekly for review; to bring log to all MFM office visits.  -Continue ADA diet with carbohydrate counting.    -Continue glucose monitoring 7 times daily as instructed.  -Ambulatory blood pressure monitoring twice daily  -Monitor for signs/symptoms of pre-eclampsia  -Stat referral to nephrology  - Medications as above.  -Serial growth ultrasounds every 4 weeks (MFM)  -Weekly fetal  surveillance until delivery (Alternate between M and Primary OB)  -Starting at 28 weeks: Fetal movement instructions given continue daily until delivery; instructed to report to labor and delivery if cannot achieve more than 10 kicks in one hour or if she perceives a decrease in fetal movement  -Continue routine prenatal care with primary ob team   -Recommend delivery at 39 weeks unless indicated sooner  -If estimated fetal weight >4500gm at time of delivery, would recommend  delivery counseling   -Intrapartum glucose monitoring is recommended, with a goal of .       Follow-up: 2 weeks for BPP    Thank  you for the consult and opportunity to care for this patient.  Please feel free to reach out with any questions or concerns.      I spent 40 minutes caring for this patient on this date of service. This time includes time spent by me in the following activities: preparing for the visit, reviewing tests, obtaining and/or reviewing a separately obtained history, performing a medically appropriate examination and/or evaluation, counseling and educating the patient/family/caregiver and independently interpreting results and communicating that information with the patient/family/caregiver with greater than 50% spent in counseling and coordination of care.     JAN Rivers  12/18/2024  Maternal Fetal Medicine-[facility]  Office: [phone]  [email or web address]

## 2024-12-23 ENCOUNTER — TRANSCRIBE ORDERS (OUTPATIENT)
Dept: ULTRASOUND IMAGING | Facility: HOSPITAL | Age: 34
End: 2024-12-23
Payer: MEDICAID

## 2024-12-23 DIAGNOSIS — O09.529 AMA (ADVANCED MATERNAL AGE) MULTIGRAVIDA 35+, UNSPECIFIED TRIMESTER: ICD-10-CM

## 2024-12-23 DIAGNOSIS — O24.419 GESTATIONAL DIABETES MELLITUS (GDM), ANTEPARTUM, GESTATIONAL DIABETES METHOD OF CONTROL UNSPECIFIED: Primary | ICD-10-CM

## 2024-12-26 ENCOUNTER — HOSPITAL ENCOUNTER (OUTPATIENT)
Facility: HOSPITAL | Age: 34
Setting detail: OBSERVATION
Discharge: HOME | End: 2024-12-27
Attending: OBSTETRICS & GYNECOLOGY | Admitting: OBSTETRICS & GYNECOLOGY
Payer: MEDICAID

## 2024-12-26 DIAGNOSIS — O24.410 DIET CONTROLLED GESTATIONAL DIABETES MELLITUS (GDM) IN THIRD TRIMESTER: ICD-10-CM

## 2024-12-26 PROBLEM — Z34.90 PREGNANCY: Status: ACTIVE | Noted: 2024-12-26

## 2024-12-26 PROBLEM — J10.1 INFLUENZA A: Status: ACTIVE | Noted: 2024-12-26

## 2024-12-26 LAB
ABO GROUP BLD: NORMAL
BLD GP AB SCN SERPL QL: NEGATIVE
DEPRECATED RDW RBC AUTO: 41.3 FL (ref 37–54)
ERYTHROCYTE [DISTWIDTH] IN BLOOD BY AUTOMATED COUNT: 13.1 % (ref 12.3–15.4)
GLUCOSE BLDC GLUCOMTR-MCNC: 105 MG/DL (ref 70–130)
HCT VFR BLD AUTO: 29.7 % (ref 34–46.6)
HGB BLD-MCNC: 9.4 G/DL (ref 12–15.9)
MCH RBC QN AUTO: 27.5 PG (ref 26.6–33)
MCHC RBC AUTO-ENTMCNC: 31.6 G/DL (ref 31.5–35.7)
MCV RBC AUTO: 86.8 FL (ref 79–97)
PLATELET # BLD AUTO: 194 10*3/MM3 (ref 140–450)
PMV BLD AUTO: 10.8 FL (ref 6–12)
RBC # BLD AUTO: 3.42 10*6/MM3 (ref 3.77–5.28)
RH BLD: POSITIVE
T&S EXPIRATION DATE: NORMAL
TREPONEMA PALLIDUM IGG+IGM AB [PRESENCE] IN SERUM OR PLASMA BY IMMUNOASSAY: NORMAL
WBC NRBC COR # BLD AUTO: 8.01 10*3/MM3 (ref 3.4–10.8)

## 2024-12-26 PROCEDURE — 82948 REAGENT STRIP/BLOOD GLUCOSE: CPT

## 2024-12-26 PROCEDURE — 59025 FETAL NON-STRESS TEST: CPT

## 2024-12-26 PROCEDURE — G0378 HOSPITAL OBSERVATION PER HR: HCPCS

## 2024-12-26 PROCEDURE — 86901 BLOOD TYPING SEROLOGIC RH(D): CPT | Performed by: OBSTETRICS & GYNECOLOGY

## 2024-12-26 PROCEDURE — 86850 RBC ANTIBODY SCREEN: CPT | Performed by: OBSTETRICS & GYNECOLOGY

## 2024-12-26 PROCEDURE — 86780 TREPONEMA PALLIDUM: CPT | Performed by: OBSTETRICS & GYNECOLOGY

## 2024-12-26 PROCEDURE — 96360 HYDRATION IV INFUSION INIT: CPT

## 2024-12-26 PROCEDURE — 86900 BLOOD TYPING SEROLOGIC ABO: CPT | Performed by: OBSTETRICS & GYNECOLOGY

## 2024-12-26 PROCEDURE — 25810000003 LACTATED RINGERS PER 1000 ML: Performed by: OBSTETRICS & GYNECOLOGY

## 2024-12-26 PROCEDURE — 85027 COMPLETE CBC AUTOMATED: CPT | Performed by: OBSTETRICS & GYNECOLOGY

## 2024-12-26 RX ORDER — BISACODYL 10 MG
10 SUPPOSITORY, RECTAL RECTAL DAILY PRN
Status: DISCONTINUED | OUTPATIENT
Start: 2024-12-26 | End: 2024-12-27 | Stop reason: HOSPADM

## 2024-12-26 RX ORDER — LIDOCAINE HYDROCHLORIDE 10 MG/ML
0.5 INJECTION, SOLUTION INFILTRATION; PERINEURAL ONCE AS NEEDED
Status: DISCONTINUED | OUTPATIENT
Start: 2024-12-26 | End: 2024-12-27 | Stop reason: HOSPADM

## 2024-12-26 RX ORDER — SODIUM CHLORIDE, SODIUM LACTATE, POTASSIUM CHLORIDE, CALCIUM CHLORIDE 600; 310; 30; 20 MG/100ML; MG/100ML; MG/100ML; MG/100ML
125 INJECTION, SOLUTION INTRAVENOUS CONTINUOUS
Status: ACTIVE | OUTPATIENT
Start: 2024-12-26 | End: 2024-12-26

## 2024-12-26 RX ORDER — ONDANSETRON 2 MG/ML
4 INJECTION INTRAMUSCULAR; INTRAVENOUS EVERY 8 HOURS PRN
Status: DISCONTINUED | OUTPATIENT
Start: 2024-12-26 | End: 2024-12-27 | Stop reason: HOSPADM

## 2024-12-26 RX ORDER — OSELTAMIVIR PHOSPHATE 75 MG/1
75 CAPSULE ORAL EVERY 12 HOURS SCHEDULED
Status: DISCONTINUED | OUTPATIENT
Start: 2024-12-26 | End: 2024-12-27 | Stop reason: HOSPADM

## 2024-12-26 RX ORDER — CALCIUM CARBONATE 500 MG/1
2 TABLET, CHEWABLE ORAL DAILY PRN
Status: DISCONTINUED | OUTPATIENT
Start: 2024-12-26 | End: 2024-12-27 | Stop reason: HOSPADM

## 2024-12-26 RX ORDER — SODIUM CHLORIDE 0.9 % (FLUSH) 0.9 %
10 SYRINGE (ML) INJECTION EVERY 12 HOURS SCHEDULED
Status: DISCONTINUED | OUTPATIENT
Start: 2024-12-26 | End: 2024-12-27 | Stop reason: HOSPADM

## 2024-12-26 RX ORDER — SODIUM CHLORIDE, SODIUM LACTATE, POTASSIUM CHLORIDE, CALCIUM CHLORIDE 600; 310; 30; 20 MG/100ML; MG/100ML; MG/100ML; MG/100ML
125 INJECTION, SOLUTION INTRAVENOUS CONTINUOUS
Status: ACTIVE | OUTPATIENT
Start: 2024-12-26 | End: 2024-12-27

## 2024-12-26 RX ORDER — ACETAMINOPHEN 325 MG/1
650 TABLET ORAL EVERY 4 HOURS PRN
Status: DISCONTINUED | OUTPATIENT
Start: 2024-12-26 | End: 2024-12-27 | Stop reason: HOSPADM

## 2024-12-26 RX ORDER — SODIUM CHLORIDE 9 MG/ML
40 INJECTION, SOLUTION INTRAVENOUS AS NEEDED
Status: DISCONTINUED | OUTPATIENT
Start: 2024-12-26 | End: 2024-12-27 | Stop reason: HOSPADM

## 2024-12-26 RX ORDER — SODIUM CHLORIDE 0.9 % (FLUSH) 0.9 %
10 SYRINGE (ML) INJECTION AS NEEDED
Status: DISCONTINUED | OUTPATIENT
Start: 2024-12-26 | End: 2024-12-27 | Stop reason: HOSPADM

## 2024-12-26 RX ORDER — ONDANSETRON 4 MG/1
8 TABLET, ORALLY DISINTEGRATING ORAL EVERY 8 HOURS PRN
Status: DISCONTINUED | OUTPATIENT
Start: 2024-12-26 | End: 2024-12-27 | Stop reason: HOSPADM

## 2024-12-26 RX ORDER — DOCUSATE SODIUM 100 MG/1
100 CAPSULE, LIQUID FILLED ORAL 2 TIMES DAILY PRN
Status: DISCONTINUED | OUTPATIENT
Start: 2024-12-26 | End: 2024-12-27 | Stop reason: HOSPADM

## 2024-12-26 RX ORDER — METFORMIN HYDROCHLORIDE 500 MG/1
500 TABLET, EXTENDED RELEASE ORAL 2 TIMES DAILY WITH MEALS
Status: DISCONTINUED | OUTPATIENT
Start: 2024-12-26 | End: 2024-12-27 | Stop reason: HOSPADM

## 2024-12-26 RX ADMIN — OSELTAMIVIR PHOSPHATE 75 MG: 75 CAPSULE ORAL at 20:48

## 2024-12-26 RX ADMIN — SODIUM CHLORIDE, SODIUM LACTATE, POTASSIUM CHLORIDE, CALCIUM CHLORIDE 125 ML/HR: 20; 30; 600; 310 INJECTION, SOLUTION INTRAVENOUS at 17:37

## 2024-12-26 RX ADMIN — METFORMIN HYDROCHLORIDE 500 MG: 500 TABLET, EXTENDED RELEASE ORAL at 20:48

## 2024-12-26 NOTE — H&P
[facility]  Obstetric History and Physical    Chief Complaint   Patient presents with    Outpatient     Pt transferred from [facility] for tachycardia r/t flu A     Shailesh Int ID [id number]  Subjective     Patient is a 34 y.o. female  currently at 35w3d, who presents with influenza A.  Initially reported to [facility] and was requested to be transferred due to tachycardia after IVF boluses.  Reports symptoms started one to two days ago.  She has mostly felt aches, fever, unwell.  Denies chest pain or SOA.  She reports she does not feel comfortable going home as she does not have support there while feeling unwell.  .    This pregnancy has been complicated by GDMA2 for which she is on Metformin and LGA, proteinuria  Her previous obstetric/gynecological history is notable for 3 prior SVDs.      Prenatal Information:  Prenatal Results       Initial Prenatal Labs       Test Value Reference Range Date Time    Hemoglobin  12.2 g/dL 11.1 - 15.9 24 0939    Hematocrit  36.2 % 34.0 - 46.6 24 0939    Platelets  337 x10E3/uL 150 - 450 24 0939    Rubella IgG  14.30 index Immune >0.99 24 0939    Hepatitis B SAg  Negative  Negative 24 0939    Hepatitis C Ab        RPR        T. Pallidum Ab   Non Reactive  Non Reactive 24 1227       Non Reactive  Non Reactive 24 0939    ABO  O   24 0939    Rh  Positive   24 0939    Antibody Screen  Negative  Negative 24 0939    HIV  Non Reactive  Non Reactive 24 1227       Non Reactive  Non Reactive 24 0939    Urine Culture  Final report   24 1340       Final report   24     Gonorrhea  Negative  Negative 24 0952       CANCELED   24     Chlamydia  Negative  Negative 24 0952       CANCELED   24     TSH        HgB A1c   5.40 % 4.80 - 5.60 24 1226    Varicella IgG        Hemoglobinopathy Fractionation  Comment   24 0939    Hemoglobinopathy (genetic testing)        Cystic  fibrosis         Spinal muscular atrophy        Fragile X                  Fetal testing        Test Value Reference Range Date Time    NIPT        MSAFP        AFP-4                  2nd and 3rd Trimester       Test Value Reference Range Date Time    Hemoglobin (repeated)  11.3 g/dL 12.0 - 15.9 12/18/24 1226       11.3 g/dL 11.1 - 15.9 12/10/24 1453       11.6 g/dL 11.1 - 15.9 11/08/24 1227    Hematocrit (repeated)  34.7 % 34.0 - 46.6 12/18/24 1226       33.7 % 34.0 - 46.6 12/10/24 1453       34.7 % 34.0 - 46.6 11/08/24 1227    Platelets   245 10*3/mm3 140 - 450 12/18/24 1226       224 x10E3/uL 150 - 450 12/10/24 1453       222 x10E3/uL 150 - 450 11/08/24 1227       337 x10E3/uL 150 - 450 07/17/24 0939    1 hour GTT   140 mg/dL 70 - 139 11/08/24 1227    Antibody Screen (repeated)        3rd TM syphilis scrn (repeated)  RPR         3rd TM syphilis scrn (repeated) TP-Ab  Non Reactive  Non Reactive 11/08/24 1227    3rd TM syphilis screen TB-Ab (FTA)  Non Reactive  Non Reactive 11/08/24 1227    Syphilis cascade test TP-Ab (EIA)        Syphilis cascade TPPA        GTT Fasting  80 mg/dL 70 - 94 11/19/24 0846    GTT 1 Hr  178 mg/dL 70 - 179 11/19/24 0846    GTT 2 Hr  186 mg/dL 70 - 154 11/19/24 0846    GTT 3 Hr  146 mg/dL 70 - 139 11/19/24 0846    Group B Strep                  Other testing        Test Value Reference Range Date Time    Parvo IgG         CMV IgG                   Drug Screening       Test Value Reference Range Date Time    Amphetamine Screen  Negative ng/mL Llutqs=4566 07/17/24     Barbiturate Screen  Negative ng/mL Oimzlu=180 07/17/24     Benzodiazepine Screen  Negative ng/mL Qdxvvp=936 07/17/24     Methadone Screen  Negative ng/mL Djcnzu=477 07/17/24     Phencyclidine Screen  Negative ng/mL Cutoff=25 07/17/24     Opiates Screen  Negative ng/mL Dpovzx=572 07/17/24     THC Screen  Negative ng/mL Cutoff=20 07/17/24     Cocaine Screen  Negative ng/mL Saoerm=469 07/17/24     Propoxyphene Screen  Negative  ng/mL Tvlcrl=526 07/17/24     Buprenorphine Screen        Methamphetamine Screen        Oxycodone Screen        Tricyclic Antidepressants Screen                  Legend    ^: Historical                          External Prenatal Results       Pregnancy Outside Results - Transcribed From Office Records - See Scanned Records For Details       Test Value Date Time    ABO  O  07/17/24 0939    Rh  Positive  07/17/24 0939    Antibody Screen  Negative  07/17/24 0939    Varicella IgG       Rubella  14.30 index 07/17/24 0939    Hgb  11.3 g/dL 12/18/24 1226       11.3 g/dL 12/10/24 1453       11.6 g/dL 11/08/24 1227       12.2 g/dL 07/17/24 0939    Hct  34.7 % 12/18/24 1226       33.7 % 12/10/24 1453       34.7 % 11/08/24 1227       36.2 % 07/17/24 0939    HgB A1c   5.40 % 12/18/24 1226    1h GTT  140 mg/dL 11/08/24 1227    3h GTT Fasting  80 mg/dL 11/19/24 0846    3h GTT 1 hour  178 mg/dL 11/19/24 0846    3h GTT 2 hour  186 mg/dL 11/19/24 0846    3h GTT 3 hour   146 mg/dL 11/19/24 0846    Gonorrhea (discrete)  Negative  08/14/24 0952       CANCELED  07/17/24     Chlamydia (discrete)  Negative  08/14/24 0952       CANCELED  07/17/24     RPR       Syphils cascade: TP-Ab (FTA)  Non Reactive  11/08/24 1227       Non Reactive  07/17/24 0939    TP-Ab  Non Reactive  11/08/24 1227       Non Reactive  07/17/24 0939    TP-Ab (EIA)       TPPA       HBsAg  Negative  07/17/24 0939    Herpes Simplex Virus PCR       Herpes Simplex VIrus Culture       HIV  Non Reactive  11/08/24 1227       Non Reactive  07/17/24 0939    Hep C RNA Quant PCR       Hep C Antibody       AFP       NIPT       Cystic Fibrosis (Neil)       Cystic Fibroisis        Spinal Muscular atrophy       Fragile X       Group B Strep       GBS Susceptibility to Clindamycin       GBS Susceptibility to Erythromycin       Fetal Fibronectin       Genetic Testing, Maternal Blood                 Drug Screening       Test Value Date Time    Urine Drug Screen       Amphetamine  Screen  Negative ng/mL 24     Barbiturate Screen  Negative ng/mL 24     Benzodiazepine Screen  Negative ng/mL 24     Methadone Screen  Negative ng/mL 24     Phencyclidine Screen  Negative ng/mL 24     Opiates Screen       THC Screen       Cocaine Screen       Propoxyphene Screen  Negative ng/mL 24     Buprenorphine Screen       Methamphetamine Screen       Oxycodone Screen       Tricyclic Antidepressants Screen                 Legend    ^: Historical                             Past OB History:     OB History    Para Term  AB Living   8 0 0 0 4 3   SAB IAB Ectopic Molar Multiple Live Births   1 3 0 0 0 3      # Outcome Date GA Lbr Nolan/2nd Weight Sex Type Anes PTL Lv   8 Current            7 SAB 24 7w0d          6      M Vag-Spont   SINDY   5      F Vag-Spont   SINDY   4      F Vag-Spont   SINDY   3 IAB            2 IAB            1 IAB                Past Medical History: Past Medical History:   Diagnosis Date    Bilateral mastodynia 2018    Complete spontaneous  without complication 2024:  - LMP 10/28/2023  - ED visit 2024 with bleeding for 2 days. BSUS showed GS, no definitive YS. Thickened endometrium.   - Quant 1204 on 24  - Patient report that since her ED visit, she has passed tissue. Showed pics during clinic visit today.  - Bleeding is now minimal with light cramping.    - PNVs sent to pharmacy since pt wishes to get pregnant again. Advised to wait for one m    COVID-19 2024    Irregular uterine bleeding     Spontaneous miscarriage 2024      Past Surgical History Past Surgical History:   Procedure Laterality Date    D & C HYSTEROSCOPY N/A 3/28/2024    Procedure: DILATATION AND CURETTAGE HYSTEROSCOPY WITH MYOSURE RESECTION OF UTERINE MASS;  Surgeon: Yossi Torres MD;  Location: [facility] MAIN OR;  Service: Obstetrics/Gynecology;  Laterality: N/A;    HYSTEROSCOPY         Family History: Family History   Problem Relation Age of Onset    Breast cancer Neg Hx     Ovarian cancer Neg Hx     Uterine cancer Neg Hx     Colon cancer Neg Hx     Malig Hyperthermia Neg Hx       Social History:  reports that she has never smoked. She has never been exposed to tobacco smoke. She has never used smokeless tobacco.   reports current alcohol use.   reports no history of drug use.         Review of Systems - Negative except per HPI for 10 point review of systems including General, Psychological, Ophthalmic, ENT, Endocrine, Respiratory, Cardiovascular, Gastrointestinal, Genito-Urinary, Musculoskeletal, Neurological, Dermatological      Objective       Vital Signs Range for the last 24 hours  Temperature:     Temp Source: Temp src: Oral   BP: BP: (114)/(72) 114/72   Pulse: Heart Rate:  [110-134] 110   Respirations: Resp:  [16] 16   SPO2: SpO2:  [100 %] 100 %   O2 Amount (l/min):     O2 Devices Device (Oxygen Therapy): room air   Weight: Weight:  [81.6 kg (180 lb)] 81.6 kg (180 lb)     Physical Examination: General appearance - alert, well appearing, and in no distress  Mental status - alert, oriented to person, place, and time  Eyes - sclera anicteric, left eye normal, right eye normal  Chest - no tachypnea, retractions or cyanosis  Heart - normal rate and regular rhythm  Abdomen - soft, nontender, gravid  Back exam - full range of motion, no tenderness, palpable spasm or pain on motion   Neurological - alert, oriented, normal speech, no focal findings or movement disorder noted   Musculoskeletal - no joint tenderness, deformity or swelling  Extremities - pedal edema trace  Skin - normal coloration and turgor, no rashes, no suspicious skin lesions noted    Cervix: Exam by:     Dilation:     Effacement:     Station:         Fetal Heart Rate Assessment   Method: Fetal HR Assessment Method: external   Beats/min: Fetal HR (beats/min): 145   Baseline: Fetal HR Baseline: normal range   Varibility: Fetal HR  Variability: minimal (detectable, amplitude less than or equal to 5 bpm)   Accels: Fetal HR Accelerations: greater than/equal to 15 bpm, lasting at least 15 seconds   Decels: Fetal HR Decelerations: absent   Tracing Category:       Uterine Assessment   Method: Method: external tocotransducer, palpation, per patient report   Frequency (min): Contraction Frequency (Minutes): X2   Ctx Count in 10 min:     Duration:     Intensity: Contraction Intensity: mild by palpation   Intensity by IUPC:     Resting Tone: Uterine Resting Tone: soft by palpation   Resting Tone by IUPC:     [place] Units:       Lab Results   Component Value Date    WBC 10.25 12/18/2024    HGB 11.3 (L) 12/18/2024    HCT 34.7 12/18/2024    MCV 86.5 12/18/2024     12/18/2024      US: See report    Assessment & Plan   Assessment:  1.  Intrauterine pregnancy at 35w3d weeks gestation with reactive, reassuring fetal status.    2.  Influenza A  3. GDMA2, on metformin  4. Proteinuria  5. AMA    Plan:  Influenza:  Pt with limited support at home.  Will start tamiflu and observe overnight. If improved can likely be discharged tomorrow with tylenol and to complete tamiflu course  2. GDMA2:   Continue metformin   Glucose patterning ordered  3. Fetal status:   Tracing reassuring. Will do NST qshift and BPP tomorrow  4. Proteinuria:   S/p MFM work up and plan for nephrology referral    Plan of care has been reviewed with patient.  Risks, benefits of treatment plan have been discussed.  All questions have been answered.      Citlalli Trujillo MD  12/26/2024  18:36 EST

## 2024-12-27 VITALS
BODY MASS INDEX: 29.99 KG/M2 | HEART RATE: 109 BPM | WEIGHT: 180 LBS | HEIGHT: 65 IN | OXYGEN SATURATION: 97 % | DIASTOLIC BLOOD PRESSURE: 81 MMHG | RESPIRATION RATE: 18 BRPM | TEMPERATURE: 98.3 F | SYSTOLIC BLOOD PRESSURE: 118 MMHG

## 2024-12-27 LAB
GLUCOSE BLDC GLUCOMTR-MCNC: 101 MG/DL (ref 70–130)
GLUCOSE BLDC GLUCOMTR-MCNC: 68 MG/DL (ref 70–130)
GLUCOSE BLDC GLUCOMTR-MCNC: 77 MG/DL (ref 70–130)
GLUCOSE BLDC GLUCOMTR-MCNC: 80 MG/DL (ref 70–130)

## 2024-12-27 PROCEDURE — 59025 FETAL NON-STRESS TEST: CPT

## 2024-12-27 PROCEDURE — 82948 REAGENT STRIP/BLOOD GLUCOSE: CPT

## 2024-12-27 PROCEDURE — 25810000003 LACTATED RINGERS PER 1000 ML: Performed by: OBSTETRICS & GYNECOLOGY

## 2024-12-27 PROCEDURE — G0378 HOSPITAL OBSERVATION PER HR: HCPCS

## 2024-12-27 RX ORDER — OSELTAMIVIR PHOSPHATE 75 MG/1
75 CAPSULE ORAL EVERY 12 HOURS SCHEDULED
Qty: 8 CAPSULE | Refills: 0 | Status: SHIPPED | OUTPATIENT
Start: 2024-12-27 | End: 2024-12-27

## 2024-12-27 RX ORDER — BENZONATATE 200 MG/1
200 CAPSULE ORAL 3 TIMES DAILY PRN
Qty: 30 CAPSULE | Refills: 1 | Status: SHIPPED | OUTPATIENT
Start: 2024-12-27

## 2024-12-27 RX ORDER — BENZONATATE 100 MG/1
200 CAPSULE ORAL 3 TIMES DAILY PRN
Status: DISCONTINUED | OUTPATIENT
Start: 2024-12-27 | End: 2024-12-27 | Stop reason: HOSPADM

## 2024-12-27 RX ORDER — OSELTAMIVIR PHOSPHATE 75 MG/1
75 CAPSULE ORAL EVERY 12 HOURS SCHEDULED
Qty: 8 CAPSULE | Refills: 0 | Status: SHIPPED | OUTPATIENT
Start: 2024-12-27 | End: 2024-12-31

## 2024-12-27 RX ADMIN — SODIUM CHLORIDE, POTASSIUM CHLORIDE, SODIUM LACTATE AND CALCIUM CHLORIDE 125 ML/HR: 600; 310; 30; 20 INJECTION, SOLUTION INTRAVENOUS at 02:15

## 2024-12-27 RX ADMIN — METFORMIN HYDROCHLORIDE 500 MG: 500 TABLET, EXTENDED RELEASE ORAL at 08:59

## 2024-12-27 RX ADMIN — OSELTAMIVIR PHOSPHATE 75 MG: 75 CAPSULE ORAL at 09:57

## 2024-12-27 NOTE — PLAN OF CARE
Goal Outcome Evaluation:  Plan of Care Reviewed With: patient           Outcome Evaluation: RNST, +FM, maternal tachycardia but less than 120, temp was in 99 range on arrival to antepartum but was 98.7 around 0400. Prolonged EFM and t oco monitoring from the NST that began at 0 345 due to prolonged FHR deceleration that occured after a 4.5-5 minute contraction. Remained category 1 for an hour after decel and patient was taken off monitor. received tamiflu and metformin last night and was on LR at 125cc.hr. Rest promoted overnight.

## 2024-12-27 NOTE — DISCHARGE SUMMARY
Date of Discharge:  12/27/2024    Discharge Diagnosis: 35-week gestation with influenza A    Presenting Problem/History of Present Illness  Active Hospital Problems    Diagnosis  POA    **Pregnancy [Z34.90]  Not Applicable    Influenza A [J10.1]  Yes      Resolved Hospital Problems   No resolved problems to display.          Hospital Course  Patient is a 34 y.o. female presented as transfer from [facility] with influenza A and maternal tachycardia.  Her symptoms mostly revolved around body aches and mild temperature elevation but denied any specific chest pain or shortness of breath.  She did start to have a mild cough.  Fetal status was reassuring throughout her stay on intermittent monitoring which showed reactive tracing.  She had normal fetal movement and denies any pregnancy related complaints such as contractions or abnormal discharge or vaginal bleeding.  On the afternoon of 12/27/2024, she reported a desire to go home and continue her recovery there.  We will send her out with a antitussive and for her to keep her next scheduled appointment..      Procedures Performed         Consults:   Consults       No orders found for last 30 day(s).            Pertinent Test Results:       Condition on Discharge: Stable    Vital Signs  Temp:  [98.3 °F (36.8 °C)-99.8 °F (37.7 °C)] 98.3 °F (36.8 °C)  Heart Rate:  [] 109  Resp:  [16-18] 18  BP: (104-120)/(52-81) 118/81    Physical Exam:   General Appearance: alert, appears stated age, and cooperative  Abdomen: normal bowel sounds, no masses, no hepatomegaly, no splenomegaly, soft non-tender, no guarding, no rebound tenderness, and fundus is 35 weeks size with no tenderness  Psych: normal    Discharge Disposition  Home or Self Care    Discharge Medications     Discharge Medications        Continue These Medications        Instructions Start Date   Blood Pressure Monitor Automat device   Use to check blood pressure twice daily      freestyle lancets   1 each,  Other, 4 Times Daily      FreeStyle Lite w/Device kit       glucose blood test strip   1 each, Other, 4 Times Daily      metFORMIN  MG 24 hr tablet  Commonly known as: GLUCOPHAGE-XR   500 mg, Oral, 2 Times Daily With Meals               Discharge Diet:     Activity at Discharge:     Follow-up Appointments  Future Appointments   Date Time Provider Department Center   1/3/2025  9:30 AM [facility] DARYL US 1 [facility] US [facility]   1/3/2025  9:30 AM Naomie Abernathy MD [facility]         Test Results Pending at Discharge       Marshall Leonard MD  12/27/24  12:36 EST    Time:

## 2024-12-27 NOTE — NON STRESS TEST
Anila Sepulveda, a  at 35w4d with an MIKE of 2025, by Ultrasound, was seen at [facility] ANTEPARTUM UNIT for a nonstress test.    Chief Complaint   Patient presents with    Outpatient     Pt transferred from [facility] for tachycardia r/t flu A       Patient Active Problem List   Diagnosis    Antepartum multigravida of advanced maternal age    Low lying placenta nos or without hemorrhage, unspecified trimester    Diet controlled gestational diabetes mellitus (GDM) in third trimester    Proteinuria affecting pregnancy in third trimester    Pregnancy    Influenza A       Start Time: 0345  Stop Time: 0505     REACTIVE

## 2024-12-27 NOTE — PLAN OF CARE
Problem: Adult Inpatient Plan of Care  Goal: Plan of Care Review  Outcome: Met  Flowsheets (Taken 12/27/2024 7123)  Outcome Evaluation: patient discharged home today  Plan of Care Reviewed With: patient   Goal Outcome Evaluation:  Plan of Care Reviewed With: patient           Outcome Evaluation: patient discharged home today

## 2024-12-27 NOTE — NON STRESS TEST
Anila Sepulveda, a  at 35w4d with an MIKE of 2025, by Ultrasound, was seen at [facility] ANTEPARTUM UNIT for a nonstress test.    Chief Complaint   Patient presents with    Outpatient     Pt transferred from [facility] for tachycardia r/t flu A       Patient Active Problem List   Diagnosis    Antepartum multigravida of advanced maternal age    Low lying placenta nos or without hemorrhage, unspecified trimester    Diet controlled gestational diabetes mellitus (GDM) in third trimester    Proteinuria affecting pregnancy in third trimester    Pregnancy    Influenza A       Start Time: 1000  Stop Time: 1051    Interpretation A  Nonstress Test Interpretation A: Reactive

## 2024-12-30 ENCOUNTER — DOCUMENTATION (OUTPATIENT)
Dept: OBSTETRICS AND GYNECOLOGY | Facility: CLINIC | Age: 34
End: 2024-12-30
Payer: MEDICAID

## 2025-01-03 ENCOUNTER — OFFICE VISIT (OUTPATIENT)
Dept: OBSTETRICS AND GYNECOLOGY | Facility: CLINIC | Age: 35
End: 2025-01-03
Payer: MEDICAID

## 2025-01-03 ENCOUNTER — HOSPITAL ENCOUNTER (OUTPATIENT)
Dept: ULTRASOUND IMAGING | Facility: HOSPITAL | Age: 35
Discharge: HOME | End: 2025-01-03
Payer: MEDICAID

## 2025-01-03 VITALS — HEART RATE: 89 BPM | DIASTOLIC BLOOD PRESSURE: 79 MMHG | SYSTOLIC BLOOD PRESSURE: 115 MMHG | TEMPERATURE: 98 F

## 2025-01-03 DIAGNOSIS — O24.419 GESTATIONAL DIABETES MELLITUS (GDM), ANTEPARTUM, GESTATIONAL DIABETES METHOD OF CONTROL UNSPECIFIED: ICD-10-CM

## 2025-01-03 DIAGNOSIS — O09.529 ANTEPARTUM MULTIGRAVIDA OF ADVANCED MATERNAL AGE: Primary | ICD-10-CM

## 2025-01-03 DIAGNOSIS — O12.13 PROTEINURIA AFFECTING PREGNANCY IN THIRD TRIMESTER: ICD-10-CM

## 2025-01-03 DIAGNOSIS — O09.529 AMA (ADVANCED MATERNAL AGE) MULTIGRAVIDA 35+, UNSPECIFIED TRIMESTER: ICD-10-CM

## 2025-01-03 LAB — GLUCOSE BLDC GLUCOMTR-MCNC: 80 MG/DL (ref 70–130)

## 2025-01-03 PROCEDURE — 82948 REAGENT STRIP/BLOOD GLUCOSE: CPT | Performed by: OBSTETRICS & GYNECOLOGY

## 2025-01-03 PROCEDURE — 76819 FETAL BIOPHYS PROFIL W/O NST: CPT

## 2025-01-03 NOTE — PROGRESS NOTES
Reason for test:  BPP 6/8 (-2 breathing)  Date of Test: 1/3/2025  Time frame of test: 2463-4740  RN NST Interpretation:  Reactive NST.. Fetal heart rate baseline 130bpm, moderate variability, +accelerations and no decelerations noted. Active fetal movement noted. NST reviewed by Dr. Abernathy.        Encouraged patient to send in blood sugar logs for review. Patient reports she does not have [employer] and her email has not been working. Patient reports she can use a friends email to send in her blood sugar logs today. Provided patient with Ari BARTLETT email: [email or web address]

## 2025-01-03 NOTE — PROGRESS NOTES
MATERNAL FETAL MEDICINE CONSULT NOTE    Dear Dr Lund ref. provider found:    Thank you for your kind referral of Anila Sepulveda.  As you know, she is a 34 y.o.   36w4d gestation (Estimated Date of Delivery: 25). This is a consult.     Her antepartum course is complicated by:  GDM  AMA  Proteinuria    Aneuploidy Screening: Low Risk  [id number] PLUS Core+SCA+ESS - Blood, (2024 09:39)   Carrier Screening: none seen    HPI: Today, denies contractions, LOF, vaginal bleeding. Reports normal fetal movement.   She denies headache, vision changes, shortness of breath, acute changes in edema, and RUQ pain.     Review of History:  Past Medical History:   Diagnosis Date    Bilateral mastodynia 2018    Complete spontaneous  without complication 2024:  - LMP 10/28/2023  - ED visit 2024 with bleeding for 2 days. BSUS showed GS, no definitive YS. Thickened endometrium.   - Quant 1204 on 24  - Patient report that since her ED visit, she has passed tissue. Showed pics during clinic visit today.  - Bleeding is now minimal with light cramping.    - PNVs sent to pharmacy since pt wishes to get pregnant again. Advised to wait for one m    COVID-19 2024    Irregular uterine bleeding     Spontaneous miscarriage 2024     Past Surgical History:   Procedure Laterality Date    D & C HYSTEROSCOPY N/A 3/28/2024    Procedure: DILATATION AND CURETTAGE HYSTEROSCOPY WITH MYOSURE RESECTION OF UTERINE MASS;  Surgeon: Yossi Torres MD;  Location: [facility] MAIN OR;  Service: Obstetrics/Gynecology;  Laterality: N/A;    HYSTEROSCOPY         OB History    Para Term  AB Living   8 0 0 0 4 3   SAB IAB Ectopic Molar Multiple Live Births   1 3 0 0 0 3      # Outcome Date GA Lbr Nolan/2nd Weight Sex Type Anes PTL Lv   8 Current            7 SAB 24 7w0d          6  2009    M Vag-Spont   SINDY   5      F Vag-Spont   SINDY   4      F Vag-Spont   SINDY    3 IAB            2 IAB            1 IAB              Social History     Socioeconomic History    Marital status: Single   Tobacco Use    Smoking status: Never     Passive exposure: Never    Smokeless tobacco: Never   Vaping Use    Vaping status: Never Used   Substance and Sexual Activity    Alcohol use: Never    Drug use: Never    Sexual activity: Yes     Partners: Male     Family History   Problem Relation Age of Onset    Breast cancer Neg Hx     Ovarian cancer Neg Hx     Uterine cancer Neg Hx     Colon cancer Neg Hx     Malig Hyperthermia Neg Hx       No Known Allergies   Current Outpatient Medications on File Prior to Visit   Medication Sig Dispense Refill    benzonatate (TESSALON) 200 MG capsule Take 1 capsule by mouth 3 (Three) Times a Day As Needed for Cough. 30 capsule 1    Blood Glucose Monitoring Suppl (FreeStyle Lite) w/Device kit       Blood Pressure Monitoring (Blood Pressure Monitor Automat) device Use to check blood pressure twice daily 1 each 0    glucose blood test strip use to check blood sugar 4 (Four) Times a Day. 100 each 5    Lancets (freestyle) lancets 1 each by Other route 4 (Four) Times a Day. 100 each 5    metFORMIN ER (GLUCOPHAGE-XR) 500 MG 24 hr tablet Take 1 tablet by mouth 2 (Two) Times a Day With Meals. 60 tablet 5     No current facility-administered medications on file prior to visit.        Common labs          12/10/2024    14:53 12/18/2024    12:26 12/26/2024    19:07   Common Labs   Glucose 165  79     BUN 6  9     Creatinine 0.37  0.46     Sodium 137  135     Potassium 3.7  4.2     Chloride 105  104     Calcium 8.9  8.6     Total Protein 6.3      Albumin 3.5  3.4     Total Bilirubin <0.2  <0.2     Alkaline Phosphatase 110  114     AST (SGOT) 20  19     ALT (SGPT) 16  14     WBC 8.5  10.25  8.01    Hemoglobin 11.3  11.3  9.4    Hematocrit 33.7  34.7  29.7    Platelets 224  245  194    Hemoglobin A1C  5.40     Uric Acid 5.3            Past obstetric, gynecological, medical,  "surgical, family and social history reviewed.  Relevant lab work and imaging reviewed.    Review of systems  Constitutional:  denies fever, chills, malaise.   ENT/Mouth:  denies sore throat, tinnitus  Eyes: denies vision changes/pain  CV:  denies chest pain  Respiratory:  denies cough/SOB  GI:  denies N/V, diarrhea, abdominal pain.    :   denies dysuria  Skin:  denies lesions or pruritus   Neuro:  denies weakness, focal neurologic symptoms    Vitals:    25 0937   BP: 115/79   BP Location: Right arm   Patient Position: Sitting   Pulse: 89   Temp: 98 °F (36.7 °C)   TempSrc: Temporal       Estimated body mass index is 29.95 kg/m² as calculated from the following:    Height as of 24: 165.1 cm (65\").    Weight as of 24: 81.6 kg (180 lb).    PHYSICAL EXAM   General: No acute distress, pleasant, AAO x 3  Respiratory: No increased work of breathing, speaking in complete sentences without difficulty, symmetric chest rise  Cardiac: Regular rate   abdominal: Gravid, nontender  Extremities: No significant edema  Neuro/psych: Awake, Alert and oriented, appropriate mood/affect      ULTRASOUND   A full ultrasound report can be found under imaging tab  Cephalic presentation.  Posterior placenta.  JOVANI 12 cm, which is normal.   BPP 6/8 for breathing.  Sent to NST.     NST  DATE: January 3, 2025  PROCEDURE: nonstress test  INTERPRETING PROVIDER: Naomie Abernathy MD  GESTATIONAL AGE: 36w4d gestation  DIAGNOSIS: GDM  INDICATION: GDM  START TIME: 11:16  END TIME: 11:40  FINDINGS: NST: 125 bpm, reactive, moderate variability (amplitude 6-25 bpm), present accels, no decels  TOCOMETRY:  mildly irritable, 1 ctx  *Patient was monitored for a minimum of 20 minutes.      ASSESSMENT/COUNSELIN y.o.   36w4d gestation (Estimated Date of Delivery: 25).     -Pregnancy  [ X ] stable  [   ] improving [  ] worsening    Diagnoses and all orders for this visit:    1. Antepartum multigravida of advanced maternal age " (Primary)    2. Proteinuria affecting pregnancy in third trimester  -     Ambulatory Referral to Nephrology    Other orders  -     POC Glucose           GESTATIONAL DIABETES  Previously counseled--did not bring logs.  Encouraged to send later today.    Regimen:  Metformin 500 mg XR BID        ADVANCED MATERNAL AGE  NIPT wnl.  Weekly ANFS at 32 weeks    PROTEINURIA  Pt's BP has been normal along with preE labs.  Pt needs nephrology appt.     Onset of proteinuria from primary ob UA results indicates timing right around 20-21 weeks  Stat referral for Nephrology ordered  Recommend patient to check blood pressure at home twice daily.  Monitor closely for pre-eclampsia.   She is aware to monitor for signs and symptoms or pre-eclampsia.  She is aware to report to EDMAR for BP elevated > 140/>90 x 2     USED  Entirety of today's encounter including patient interview, exam, and counseling performed with the aid of a medical Shailesh  via the telephone.      Summary of Plan  - Recommend emailing/fax/submitting her log weekly for review; to bring log to all M office visits.  -Continue ADA diet with carbohydrate counting.    -Continue glucose monitoring 7 times daily as instructed.  -Ambulatory blood pressure monitoring twice daily  -Monitor for signs/symptoms of pre-eclampsia  -Stat referral to nephrology  - Medications as above.  -Serial growth ultrasounds every 4 weeks (MFM)  -Weekly fetal  surveillance until delivery (Alternate between [facility] and Primary OB)  -Starting at 28 weeks: Fetal movement instructions given continue daily until delivery; instructed to report to labor and delivery if cannot achieve more than 10 kicks in one hour or if she perceives a decrease in fetal movement  -Continue routine prenatal care with primary ob team   -Recommend delivery at 39 weeks unless indicated sooner  -If estimated fetal weight >4500gm at time of delivery, would recommend  delivery  counseling   -Intrapartum glucose monitoring is recommended, with a goal of .     Follow-up: 2 weeks for BPP    Thank you for the consult and opportunity to care for this patient.  Please feel free to reach out with any questions or concerns.      I spent 25 minutes caring for this patient on this date of service. This time includes time spent by me in the following activities: preparing for the visit, reviewing tests, obtaining and/or reviewing a separately obtained history, performing a medically appropriate examination and/or evaluation, counseling and educating the patient/family/caregiver and independently interpreting results and communicating that information with the patient/family/caregiver with greater than 50% spent in counseling and coordination of care.     Naomie Abernathy MD [facility]  Maternal Fetal Medicine-[facility]  Office: [phone]  [email or web address]

## 2025-01-03 NOTE — PROGRESS NOTES
Pt reports that she is doing well and denies vaginal bleeding, cramping, contractions or LOF at this time. Reports active fetal movement. Reviewed when to call OB office or present to L&D for evaluation with symptoms such as decreased fetal movement, vaginal bleeding, LOF or ctxs. Pt verbalized understanding. Denies HA, visual changes or epigastric pain. Denies any additional complaints at time of appointment. Needs to schedule next OB appointment.     Patient did not bring blood sugar logs today. Will send tripJane message so that patient can upload when she gets home. Blood sugar taken at time of appointment with value of 80.    Vitals:    01/03/25 0937   BP: 115/79   Pulse: 89   Temp: 98 °F (36.7 °C)

## 2025-01-09 ENCOUNTER — ROUTINE PRENATAL (OUTPATIENT)
Dept: OBSTETRICS AND GYNECOLOGY | Facility: CLINIC | Age: 35
End: 2025-01-09
Payer: MEDICAID

## 2025-01-09 VITALS — SYSTOLIC BLOOD PRESSURE: 122 MMHG | WEIGHT: 181.2 LBS | DIASTOLIC BLOOD PRESSURE: 84 MMHG | BODY MASS INDEX: 30.15 KG/M2

## 2025-01-09 DIAGNOSIS — O12.13 PROTEINURIA AFFECTING PREGNANCY IN THIRD TRIMESTER: ICD-10-CM

## 2025-01-09 DIAGNOSIS — O24.410 DIET CONTROLLED GESTATIONAL DIABETES MELLITUS (GDM) IN THIRD TRIMESTER: ICD-10-CM

## 2025-01-09 DIAGNOSIS — O09.529 ANTEPARTUM MULTIGRAVIDA OF ADVANCED MATERNAL AGE: Primary | ICD-10-CM

## 2025-01-09 NOTE — LETTER
25    SCHEDULING FORM  C-SECTIONS/INDUCTIONS    Patient:  Anila Sepulveda :  1990    Phone: [phone] (home)     OB provider:  Yossi Torres MD    Summary:  34 y.o. female     Type of Delivery:  Induction   Priority:  Medical    Desired Date: 25      Time: 0700    EDC Estimated Date of Delivery: 25  Cervical exam:  / /        Guo Score:     Induction agent: Cook cathater    Gestational age at Desired date of Induction or CS: 39 weeks 3 days  ----------------------------------------------------------------------------  By ACOG Guidelines, women should be 39 weeks or greater before initiating an elective induction (non-medically indicated) delivery     Maternal Indications:   Diabetes in pregnancy (DM 1, DM2, GDM A1, A2, B etc), Proteinuria, AMA    Fetal/Placental Conditions:  LGA  ----------------------------------------------------------------------------  For patients less than 39 weeks with indications not included in the above list, [facility] consult is required prior to scheduling.    [facility] Approved indication:   Date of consult:      Additional considerations for induction priority:      I attest that the above entries are accurate to the best of my knowledge:    Yossi Torres MD  2025  09:39 EST

## 2025-01-09 NOTE — PROGRESS NOTES
Chief complaint: Prenatal visit  History of present illness: Patient is here for her routine prenatal visit.  She is without major complaints at this time.  She denies vaginal bleeding, leakage of fluid.  She reports active fetal movement.  She does report some occasional mild contractions, but nothing regular.    Patient has been checking her sugars more regularly.  She has been following with maternal-fetal medicine.  She is still not seen nephrology.  She has still not completed a 24-hour urine collection    Objective: See vital signs in the flowsheet  General: No acute distress, awake and oriented x3  Abdomen: Soft, nontender, gravid, fetal heart tones 140, fundal height 36 cm  Pelvic exam performed in the presence of a female chaperone.  Patient provided verbal consent to proceed with exam today.  Normal external female genitalia, no lesions  No vaginal discharge or bleeding  Cervix: Closed/20% effaced/-1  Extremities: No lower extremity edema, no calf tenderness    Ultrasound today:  Biophysical profile   Normal amniotic fluid index.  Cephalic presentation    Labs:  Review of Accu-Cheks.  All 7 fasting levels from the last week were at goal.  Pre and postprandial Accu-Cheks have been normal for the most part.  Four levels above goal with a high of 132.    Assessment:  1.  34-year-old  8 para 3-0-4-3 at 37-3/7 weeks gestational age  2.  Gestational diabetes, diet-controlled  3.  Large for gestational age infant  4.  Significant proteinuria without evidence of preeclampsia    Plan:  1.  Ultrasound today shows reassuring BPP with normal amniotic fluid.  2.  Reviewed Accu-Chek results with the patient.  Discussed goals.  She seems to be doing well with this.  Will continue dietary management.  Continue to follow-up with maternal-fetal medicine  3.  Patient still has not seen nephrology.  We will send in another referral again today.  Hopefully this can get done before she delivers  4.  Patient has not  completed a 24-hour urine collection still.  I do not think it is very likely she will be compliant with this prior to delivery.  We can keep checking protein creatinine ratios.  5.  Preeclampsia labs today  6.  GBS today  7.  I recommend delivery at 39 weeks gestation due to gestational diabetes, large for gestational age infant, and proteinuria.  Risks and benefits were discussed with the patient.  She agrees with the plan.  Will plan for labor induction on 1/23 at 7 AM.  Healing

## 2025-01-10 ENCOUNTER — DOCUMENTATION (OUTPATIENT)
Dept: OBSTETRICS AND GYNECOLOGY | Facility: CLINIC | Age: 35
End: 2025-01-10
Payer: MEDICAID

## 2025-01-10 ENCOUNTER — TRANSCRIBE ORDERS (OUTPATIENT)
Dept: ULTRASOUND IMAGING | Facility: HOSPITAL | Age: 35
End: 2025-01-10
Payer: MEDICAID

## 2025-01-10 DIAGNOSIS — O09.529 AMA (ADVANCED MATERNAL AGE) MULTIGRAVIDA 35+, UNSPECIFIED TRIMESTER: ICD-10-CM

## 2025-01-10 DIAGNOSIS — O12.13 PROTEINURIA AFFECTING PREGNANCY IN THIRD TRIMESTER: ICD-10-CM

## 2025-01-10 DIAGNOSIS — O24.419 GESTATIONAL DIABETES MELLITUS (GDM), ANTEPARTUM, GESTATIONAL DIABETES METHOD OF CONTROL UNSPECIFIED: Primary | ICD-10-CM

## 2025-01-10 LAB
ALBUMIN SERPL-MCNC: 3 G/DL (ref 3.9–4.9)
ALP SERPL-CCNC: 195 IU/L (ref 44–121)
ALT SERPL-CCNC: 23 IU/L (ref 0–32)
AST SERPL-CCNC: 19 IU/L (ref 0–40)
BILIRUB SERPL-MCNC: <0.2 MG/DL (ref 0–1.2)
BUN SERPL-MCNC: 5 MG/DL (ref 6–20)
BUN/CREAT SERPL: 10 (ref 9–23)
CALCIUM SERPL-MCNC: 8.3 MG/DL (ref 8.7–10.2)
CHLORIDE SERPL-SCNC: 104 MMOL/L (ref 96–106)
CO2 SERPL-SCNC: 19 MMOL/L (ref 20–29)
CREAT SERPL-MCNC: 0.5 MG/DL (ref 0.57–1)
EGFRCR SERPLBLD CKD-EPI 2021: 125 ML/MIN/1.73
ERYTHROCYTE [DISTWIDTH] IN BLOOD BY AUTOMATED COUNT: 13.2 % (ref 11.7–15.4)
GLOBULIN SER CALC-MCNC: 2.6 G/DL (ref 1.5–4.5)
GLUCOSE SERPL-MCNC: 103 MG/DL (ref 70–99)
HCT VFR BLD AUTO: 33.9 % (ref 34–46.6)
HGB BLD-MCNC: 11 G/DL (ref 11.1–15.9)
MCH RBC QN AUTO: 27.7 PG (ref 26.6–33)
MCHC RBC AUTO-ENTMCNC: 32.4 G/DL (ref 31.5–35.7)
MCV RBC AUTO: 85 FL (ref 79–97)
PLATELET # BLD AUTO: 263 X10E3/UL (ref 150–450)
POTASSIUM SERPL-SCNC: 4.2 MMOL/L (ref 3.5–5.2)
PROT SERPL-MCNC: 5.6 G/DL (ref 6–8.5)
RBC # BLD AUTO: 3.97 X10E6/UL (ref 3.77–5.28)
SODIUM SERPL-SCNC: 137 MMOL/L (ref 134–144)
WBC # BLD AUTO: 7.9 X10E3/UL (ref 3.4–10.8)

## 2025-01-11 LAB — GP B STREP DNA SPEC QL NAA+PROBE: POSITIVE

## 2025-01-13 ENCOUNTER — PREP FOR SURGERY (OUTPATIENT)
Dept: OTHER | Facility: HOSPITAL | Age: 35
End: 2025-01-13
Payer: MEDICAID

## 2025-01-13 RX ORDER — PENICILLIN G 3000000 [IU]/50ML
3 INJECTION, SOLUTION INTRAVENOUS EVERY 4 HOURS
OUTPATIENT
Start: 2025-01-13 | End: 2025-01-16

## 2025-01-13 RX ORDER — OXYTOCIN/0.9 % SODIUM CHLORIDE 30/500 ML
999 PLASTIC BAG, INJECTION (ML) INTRAVENOUS ONCE
OUTPATIENT
Start: 2025-01-13 | End: 2025-01-13

## 2025-01-13 RX ORDER — MISOPROSTOL 200 UG/1
800 TABLET ORAL AS NEEDED
OUTPATIENT
Start: 2025-01-13

## 2025-01-13 RX ORDER — FAMOTIDINE 10 MG/ML
20 INJECTION, SOLUTION INTRAVENOUS EVERY 12 HOURS PRN
OUTPATIENT
Start: 2025-01-13

## 2025-01-13 RX ORDER — TRANEXAMIC ACID 10 MG/ML
1000 INJECTION, SOLUTION INTRAVENOUS ONCE AS NEEDED
OUTPATIENT
Start: 2025-01-13

## 2025-01-13 RX ORDER — ONDANSETRON 2 MG/ML
4 INJECTION INTRAMUSCULAR; INTRAVENOUS EVERY 6 HOURS PRN
OUTPATIENT
Start: 2025-01-13

## 2025-01-13 RX ORDER — TERBUTALINE SULFATE 1 MG/ML
0.25 INJECTION, SOLUTION SUBCUTANEOUS AS NEEDED
OUTPATIENT
Start: 2025-01-13

## 2025-01-13 RX ORDER — MISOPROSTOL 100 MCG
25 TABLET ORAL EVERY 4 HOURS
OUTPATIENT
Start: 2025-01-13 | End: 2025-01-14

## 2025-01-13 RX ORDER — METHYLERGONOVINE MALEATE 0.2 MG/ML
200 INJECTION INTRAVENOUS ONCE AS NEEDED
OUTPATIENT
Start: 2025-01-13

## 2025-01-13 RX ORDER — LIDOCAINE HYDROCHLORIDE 10 MG/ML
0.5 INJECTION, SOLUTION INFILTRATION; PERINEURAL ONCE AS NEEDED
OUTPATIENT
Start: 2025-01-13

## 2025-01-13 RX ORDER — SODIUM CHLORIDE, SODIUM LACTATE, POTASSIUM CHLORIDE, CALCIUM CHLORIDE 600; 310; 30; 20 MG/100ML; MG/100ML; MG/100ML; MG/100ML
50 INJECTION, SOLUTION INTRAVENOUS CONTINUOUS
OUTPATIENT
Start: 2025-01-13 | End: 2025-01-14

## 2025-01-13 RX ORDER — MAGNESIUM CARB/ALUMINUM HYDROX 105-160MG
30 TABLET,CHEWABLE ORAL ONCE
OUTPATIENT
Start: 2025-01-13 | End: 2025-01-13

## 2025-01-13 RX ORDER — SODIUM CHLORIDE 0.9 % (FLUSH) 0.9 %
10 SYRINGE (ML) INJECTION EVERY 12 HOURS SCHEDULED
OUTPATIENT
Start: 2025-01-13

## 2025-01-13 RX ORDER — ONDANSETRON 4 MG/1
4 TABLET, ORALLY DISINTEGRATING ORAL EVERY 6 HOURS PRN
OUTPATIENT
Start: 2025-01-13

## 2025-01-13 RX ORDER — OXYTOCIN/0.9 % SODIUM CHLORIDE 30/500 ML
250 PLASTIC BAG, INJECTION (ML) INTRAVENOUS CONTINUOUS
OUTPATIENT
Start: 2025-01-13 | End: 2025-01-13

## 2025-01-13 RX ORDER — ACETAMINOPHEN 325 MG/1
650 TABLET ORAL EVERY 4 HOURS PRN
OUTPATIENT
Start: 2025-01-13

## 2025-01-13 RX ORDER — SODIUM CHLORIDE 0.9 % (FLUSH) 0.9 %
10 SYRINGE (ML) INJECTION AS NEEDED
OUTPATIENT
Start: 2025-01-13

## 2025-01-13 RX ORDER — CARBOPROST TROMETHAMINE 250 UG/ML
250 INJECTION, SOLUTION INTRAMUSCULAR AS NEEDED
OUTPATIENT
Start: 2025-01-13

## 2025-01-13 RX ORDER — FAMOTIDINE 20 MG/1
20 TABLET, FILM COATED ORAL EVERY 12 HOURS PRN
OUTPATIENT
Start: 2025-01-13

## 2025-01-13 RX ORDER — MORPHINE SULFATE 2 MG/ML
2 INJECTION, SOLUTION INTRAMUSCULAR; INTRAVENOUS
OUTPATIENT
Start: 2025-01-13 | End: 2025-01-18

## 2025-01-14 ENCOUNTER — ROUTINE PRENATAL (OUTPATIENT)
Dept: OBSTETRICS AND GYNECOLOGY | Facility: CLINIC | Age: 35
End: 2025-01-14
Payer: MEDICAID

## 2025-01-14 VITALS — WEIGHT: 180 LBS | SYSTOLIC BLOOD PRESSURE: 116 MMHG | BODY MASS INDEX: 29.95 KG/M2 | DIASTOLIC BLOOD PRESSURE: 81 MMHG

## 2025-01-14 DIAGNOSIS — O28.3 ABNORMAL FETAL ULTRASOUND: ICD-10-CM

## 2025-01-14 DIAGNOSIS — O12.13 PROTEINURIA AFFECTING PREGNANCY IN THIRD TRIMESTER: ICD-10-CM

## 2025-01-14 DIAGNOSIS — Z3A.38 38 WEEKS GESTATION OF PREGNANCY: Primary | ICD-10-CM

## 2025-01-14 DIAGNOSIS — O09.529 ANTEPARTUM MULTIGRAVIDA OF ADVANCED MATERNAL AGE: ICD-10-CM

## 2025-01-14 DIAGNOSIS — O24.415 GESTATIONAL DIABETES MELLITUS (GDM) IN THIRD TRIMESTER CONTROLLED ON ORAL HYPOGLYCEMIC DRUG: ICD-10-CM

## 2025-01-14 PROBLEM — O24.410 DIET CONTROLLED GESTATIONAL DIABETES MELLITUS (GDM) IN THIRD TRIMESTER: Status: RESOLVED | Noted: 2024-11-21 | Resolved: 2025-01-14

## 2025-01-14 PROBLEM — J10.1 INFLUENZA A: Status: RESOLVED | Noted: 2024-12-26 | Resolved: 2025-01-14

## 2025-01-14 LAB
CLINDAMYCIN ISLT KB: ABNORMAL
GLUCOSE UR STRIP-MCNC: NEGATIVE MG/DL
GP B STREP DNA SPEC QL NAA+PROBE: POSITIVE
ORGANISM ID: ABNORMAL
PROT UR STRIP-MCNC: ABNORMAL MG/DL

## 2025-01-14 NOTE — PROGRESS NOTES
Chief complaint: Prenatal visit  History of present illness: Patient is here for her routine prenatal visit.  She is without major complaints at this time.  She denies vaginal bleeding, leakage of fluid.  She reports active fetal movement.  She does report some occasional mild contractions, but nothing regular.    Objective: See vital signs in the flowsheet  General: No acute distress, awake and oriented x3  Abdomen: Soft, nontender, gravid, fetal heart tones 140, fundal height 36 cm  Pelvic exam performed in the presence of a female chaperone.  Patient provided verbal consent to proceed with exam today.  Normal external female genitalia, no lesions  No vaginal discharge or bleeding  Cervix: Fingertip dilated/50% effaced/-2  Extremities: No lower extremity edema, no calf tenderness    Labs:  GBS positive    Accu-Chek results: Postprandials all within normal limit with the exception of a single listing of 125  Fasting results: Slightly elevated.  Last 2 readings last 2 days 105 and 116    Ultrasound today:  Biophysical profile   Normal amniotic fluid  Cephalic presentation.  Stomach appears distended on exam today.  Similar appearance was noted on ultrasound 2025.    Assessment:  1.  34-year-old  8 para 3-0-4-3 at 38 and 1 sevenths weeks gestational age  2.  Gestational diabetes, controlled on oral agents  3.  Large for gestational age infant  4.  Advanced maternal age  5.  New finding of distended fetal stomach on ultrasound today.  6.  GBS positive    Plan:  1.  We discussed ultrasound findings today.  Discussed the new finding of distended stomach.  This was also noted last week; however, at that time it was felt could have been a variation of normal.  Distention continues today.  I have spoken with maternal-fetal medicine about this.  They will evaluate the fetal stomach when they see her in 3 days.  2.  We discussed the importance of compliance with gestational diabetes diet and continued use of  metformin.  Management of sugars as per maternal-fetal medicine.  3.  We discussed plan for labor induction next week due to gestational diabetes and large for gestational age infant.  She is currently scheduled for 1/23.  Labor signs discussed.  4 . return to the office in 1 week.  BPP next week

## 2025-01-16 ENCOUNTER — TELEPHONE (OUTPATIENT)
Dept: OBSTETRICS AND GYNECOLOGY | Facility: CLINIC | Age: 35
End: 2025-01-16
Payer: MEDICAID

## 2025-01-16 LAB
CREAT UR-MCNC: 122 MG/DL
PROT UR-MCNC: 1138.5 MG/DL
PROT/CREAT UR: 9332 MG/G CREAT (ref 0–200)

## 2025-01-16 NOTE — TELEPHONE ENCOUNTER
Call placed to Anila using Shailesh  ID #[id number] to review upcoming MFM appointment on 1/17 at 1:30pm. Encouraged patient to bring blood sugar logs for MFM providers to review. Patient acknowledged understanding.

## 2025-01-17 ENCOUNTER — OFFICE VISIT (OUTPATIENT)
Dept: OBSTETRICS AND GYNECOLOGY | Facility: CLINIC | Age: 35
End: 2025-01-17
Payer: MEDICAID

## 2025-01-17 ENCOUNTER — HOSPITAL ENCOUNTER (OUTPATIENT)
Dept: ULTRASOUND IMAGING | Facility: HOSPITAL | Age: 35
Discharge: HOME | End: 2025-01-17
Payer: MEDICAID

## 2025-01-17 VITALS
HEART RATE: 104 BPM | SYSTOLIC BLOOD PRESSURE: 121 MMHG | HEIGHT: 65 IN | TEMPERATURE: 97.8 F | BODY MASS INDEX: 30.49 KG/M2 | WEIGHT: 183 LBS | DIASTOLIC BLOOD PRESSURE: 74 MMHG

## 2025-01-17 DIAGNOSIS — O09.529 AMA (ADVANCED MATERNAL AGE) MULTIGRAVIDA 35+, UNSPECIFIED TRIMESTER: ICD-10-CM

## 2025-01-17 DIAGNOSIS — O12.13 PROTEINURIA AFFECTING PREGNANCY IN THIRD TRIMESTER: ICD-10-CM

## 2025-01-17 DIAGNOSIS — O24.419 GESTATIONAL DIABETES MELLITUS (GDM), ANTEPARTUM, GESTATIONAL DIABETES METHOD OF CONTROL UNSPECIFIED: ICD-10-CM

## 2025-01-17 DIAGNOSIS — O24.415 GESTATIONAL DIABETES MELLITUS (GDM) IN THIRD TRIMESTER CONTROLLED ON ORAL HYPOGLYCEMIC DRUG: Primary | ICD-10-CM

## 2025-01-17 LAB — GLUCOSE BLDC GLUCOMTR-MCNC: 106 MG/DL (ref 70–130)

## 2025-01-17 PROCEDURE — 76819 FETAL BIOPHYS PROFIL W/O NST: CPT

## 2025-01-17 PROCEDURE — 76816 OB US FOLLOW-UP PER FETUS: CPT

## 2025-01-17 PROCEDURE — 82948 REAGENT STRIP/BLOOD GLUCOSE: CPT | Performed by: OBSTETRICS & GYNECOLOGY

## 2025-01-17 NOTE — LETTER
2025     Yossi Torres MD  [address]  [address]  [place] [phone]    Patient: Anila Sepulveda   YOB: 1990   Date of Visit: 2025       Dear Yossi Torres MD    Anila Sepulveda was in my office today. Below is a copy of my note.    If you have questions, please do not hesitate to call me. I look forward to following Anila along with you.         Sincerely,        Naomie Abernathy MD    MATERNAL FETAL MEDICINE CONSULT NOTE    Dear  No ref. provider found:    Thank you for your kind referral of Anila Sepulveda.  As you know, she is a 34 y.o.   38w4d gestation (Estimated Date of Delivery: 25). This is a consult.     Her antepartum course is complicated by:  GDMA2  AMA  Proteinuria    Aneuploidy Screening: Low Risk  [id number] PLUS Core+SCA+ESS - Blood, (2024 09:39)   Carrier Screening: none seen    HPI: Today, denies contractions, LOF, vaginal bleeding. Reports normal fetal movement.   She denies headache, vision changes, shortness of breath, acute changes in edema, and RUQ pain.     Review of History:  Past Medical History:   Diagnosis Date   • Bilateral mastodynia 2018   • Complete spontaneous  without complication 2024:  - LMP 10/28/2023  - ED visit 2024 with bleeding for 2 days. BSUS showed GS, no definitive YS. Thickened endometrium.   - Quant 1204 on 24  - Patient report that since her ED visit, she has passed tissue. Showed pics during clinic visit today.  - Bleeding is now minimal with light cramping.    - PNVs sent to pharmacy since pt wishes to get pregnant again. Advised to wait for one m   • COVID-19 2024   • Irregular uterine bleeding    • Spontaneous miscarriage 2024     Past Surgical History:   Procedure Laterality Date   • D & C HYSTEROSCOPY N/A 3/28/2024    Procedure: DILATATION AND CURETTAGE HYSTEROSCOPY WITH MYOSURE RESECTION OF UTERINE MASS;  Surgeon: Yossi Torres  MD Justice;  Location: [facility] MAIN OR;  Service: Obstetrics/Gynecology;  Laterality: N/A;   • HYSTEROSCOPY         OB History    Para Term  AB Living   8 0 0 0 4 3   SAB IAB Ectopic Molar Multiple Live Births   1 3 0 0 0 3      # Outcome Date GA Lbr Nolan/2nd Weight Sex Type Anes PTL Lv   8 Current            7 SAB 24 7w0d          6      M Vag-Spont   SINDY   5      F Vag-Spont   SINDY   4      F Vag-Spont   SINDY   3 IAB            2 IAB            1 IAB              Social History     Socioeconomic History   • Marital status: Single   Tobacco Use   • Smoking status: Never     Passive exposure: Never   • Smokeless tobacco: Never   Vaping Use   • Vaping status: Never Used   Substance and Sexual Activity   • Alcohol use: Never   • Drug use: Never   • Sexual activity: Yes     Partners: Male     Family History   Problem Relation Age of Onset   • Breast cancer Neg Hx    • Ovarian cancer Neg Hx    • Uterine cancer Neg Hx    • Colon cancer Neg Hx    • Malig Hyperthermia Neg Hx       No Known Allergies   Current Outpatient Medications on File Prior to Visit   Medication Sig Dispense Refill   • metFORMIN ER (GLUCOPHAGE-XR) 500 MG 24 hr tablet Take 1 tablet by mouth 2 (Two) Times a Day With Meals. 60 tablet 5   • benzonatate (TESSALON) 200 MG capsule Take 1 capsule by mouth 3 (Three) Times a Day As Needed for Cough. (Patient not taking: Reported on 2025) 30 capsule 1   • Blood Glucose Monitoring Suppl (FreeStyle Lite) w/Device kit      • Blood Pressure Monitoring (Blood Pressure Monitor Automat) device Use to check blood pressure twice daily (Patient not taking: Reported on 2025) 1 each 0   • glucose blood test strip use to check blood sugar 4 (Four) Times a Day. 100 each 5   • Lancets (freestyle) lancets 1 each by Other route 4 (Four) Times a Day. 100 each 5     No current facility-administered medications on file prior to visit.        Common labs          2024  "   12:26 2024    19:07 2025    09:56   Common Labs   Glucose 79   103    BUN 9   5    Creatinine 0.46   0.50    Sodium 135   137    Potassium 4.2   4.2    Chloride 104   104    Calcium 8.6   8.3    Total Protein   5.6    Albumin 3.4   3.0    Total Bilirubin <0.2   <0.2    Alkaline Phosphatase 114   195    AST (SGOT) 19   19    ALT (SGPT) 14   23    WBC 10.25  8.01  7.9    Hemoglobin 11.3  9.4  11.0    Hematocrit 34.7  29.7  33.9    Platelets 245  194  263    Hemoglobin A1C 5.40            Past obstetric, gynecological, medical, surgical, family and social history reviewed.  Relevant lab work and imaging reviewed.    Review of systems  Constitutional:  denies fever, chills, malaise.   ENT/Mouth:  denies sore throat, tinnitus  Eyes: denies vision changes/pain  CV:  denies chest pain  Respiratory:  denies cough/SOB  GI:  denies N/V, diarrhea, abdominal pain.    :   denies dysuria  Skin:  denies lesions or pruritus   Neuro:  denies weakness, focal neurologic symptoms    Vitals:    25 1421   BP: 121/74   BP Location: Right arm   Pulse: 104   Temp: 97.8 °F (36.6 °C)   TempSrc: Temporal   Weight: 83 kg (183 lb)   Height: 165 cm (64.96\")         Estimated body mass index is 30.49 kg/m² as calculated from the following:    Height as of this encounter: 165 cm (64.96\").    Weight as of this encounter: 83 kg (183 lb).    PHYSICAL EXAM   General: No acute distress, pleasant, AAO x 3  Respiratory: No increased work of breathing, speaking in complete sentences without difficulty, symmetric chest rise  Cardiac: Regular rate   abdominal: Gravid, nontender  Extremities: No significant edema  Neuro/psych: Awake, Alert and oriented, appropriate mood/affect      ULTRASOUND   A full ultrasound report can be found under imaging tab  Cephalic presentation.  Posterior placenta.  JOVANI 15 cm, which is normal.  EFW 3633 g (75%, AC 32%)  BPP     ASSESSMENT/COUNSELIN y.o.   38w4d gestation (Estimated Date of " Delivery: 1/27/25).     -Pregnancy  [ X ] stable  [   ] improving [  ] worsening    Diagnoses and all orders for this visit:    1. Gestational diabetes mellitus (GDM) in third trimester controlled on oral hypoglycemic drug (Primary)    2. Proteinuria affecting pregnancy in third trimester    Other orders  -     POC Glucose        GESTATIONAL DIABETES  Previously counseled--did not bring logs.  Encouraged to send later today.    Regimen:  Metformin 500 mg XR BID--stop after delivery.    Will need 2 hour GTT postpartum.    ADVANCED MATERNAL AGE  NIPT wnl.  Weekly ANFS at 32 weeks    PROTEINURIA  Pt's BP has been normal along with preE labs.  Pt needs nephrology appt--scheduled for 2/20.       Onset of proteinuria from primary ob UA results indicates timing right around 20-21 weeks  Nephrology scheduled 2/20  Recommend patient to check blood pressure at home twice daily.  Monitor closely for pre-eclampsia.   She is aware to monitor for signs and symptoms or pre-eclampsia.  She is aware to report to EDMAR for BP elevated > 140/>90 x 2    Pre E precautions and fetal movement/labor precautions given.      USED  Entirety of today's encounter including patient interview, exam, and counseling performed with the aid of a medical Shailesh  via the telephone.      Summary of Plan  - Recommend emailing/fax/submitting her log weekly for review; to bring log to all M office visits.  -Continue ADA diet with carbohydrate counting.    -Ambulatory blood pressure monitoring twice daily  -Delivery next week  -Intrapartum glucose monitoring is recommended, with a goal of .   -Scheduled for IOL 1/23    Follow-up: No follow up, pt delivering.      Thank you for the consult and opportunity to care for this patient.  Please feel free to reach out with any questions or concerns.      I spent 20 minutes caring for this patient on this date of service. This time includes time spent by me in the following  activities: preparing for the visit, reviewing tests, obtaining and/or reviewing a separately obtained history, performing a medically appropriate examination and/or evaluation, counseling and educating the patient/family/caregiver and independently interpreting results and communicating that information with the patient/family/caregiver with greater than 50% spent in counseling and coordination of care.     3 minutes reading US.      Naomie Abernathy MD FACOG  Maternal Fetal Medicine-[facility]  Office: [phone]  [email or web address]

## 2025-01-17 NOTE — PROGRESS NOTES
MATERNAL FETAL MEDICINE CONSULT NOTE    Dear Dr Lund ref. provider found:    Thank you for your kind referral of Anila Sepulveda.  As you know, she is a 34 y.o.   38w4d gestation (Estimated Date of Delivery: 25). This is a consult.     Her antepartum course is complicated by:  GDMA2  AMA  Proteinuria    Aneuploidy Screening: Low Risk  [id number] PLUS Core+SCA+ESS - Blood, (2024 09:39)   Carrier Screening: none seen    HPI: Today, denies contractions, LOF, vaginal bleeding. Reports normal fetal movement.   She denies headache, vision changes, shortness of breath, acute changes in edema, and RUQ pain.     Review of History:  Past Medical History:   Diagnosis Date    Bilateral mastodynia 2018    Complete spontaneous  without complication 2024:  - LMP 10/28/2023  - ED visit 2024 with bleeding for 2 days. BSUS showed GS, no definitive YS. Thickened endometrium.   - Quant 1204 on 24  - Patient report that since her ED visit, she has passed tissue. Showed pics during clinic visit today.  - Bleeding is now minimal with light cramping.    - PNVs sent to pharmacy since pt wishes to get pregnant again. Advised to wait for one m    COVID-19 2024    Irregular uterine bleeding     Spontaneous miscarriage 2024     Past Surgical History:   Procedure Laterality Date    D & C HYSTEROSCOPY N/A 3/28/2024    Procedure: DILATATION AND CURETTAGE HYSTEROSCOPY WITH MYOSURE RESECTION OF UTERINE MASS;  Surgeon: Yossi Torres MD;  Location: [facility] MAIN OR;  Service: Obstetrics/Gynecology;  Laterality: N/A;    HYSTEROSCOPY         OB History    Para Term  AB Living   8 0 0 0 4 3   SAB IAB Ectopic Molar Multiple Live Births   1 3 0 0 0 3      # Outcome Date GA Lbr Nolan/2nd Weight Sex Type Anes PTL Lv   8 Current            7 SAB 24 7w0d          6  2009    M Vag-Spont   SINDY   5      F Vag-Spont   SINDY   4      F Vag-Spont    SINDY   3 IAB            2 IAB            1 IAB              Social History     Socioeconomic History    Marital status: Single   Tobacco Use    Smoking status: Never     Passive exposure: Never    Smokeless tobacco: Never   Vaping Use    Vaping status: Never Used   Substance and Sexual Activity    Alcohol use: Never    Drug use: Never    Sexual activity: Yes     Partners: Male     Family History   Problem Relation Age of Onset    Breast cancer Neg Hx     Ovarian cancer Neg Hx     Uterine cancer Neg Hx     Colon cancer Neg Hx     Malig Hyperthermia Neg Hx       No Known Allergies   Current Outpatient Medications on File Prior to Visit   Medication Sig Dispense Refill    metFORMIN ER (GLUCOPHAGE-XR) 500 MG 24 hr tablet Take 1 tablet by mouth 2 (Two) Times a Day With Meals. 60 tablet 5    benzonatate (TESSALON) 200 MG capsule Take 1 capsule by mouth 3 (Three) Times a Day As Needed for Cough. (Patient not taking: Reported on 1/17/2025) 30 capsule 1    Blood Glucose Monitoring Suppl (FreeStyle Lite) w/Device kit       Blood Pressure Monitoring (Blood Pressure Monitor Automat) device Use to check blood pressure twice daily (Patient not taking: Reported on 1/17/2025) 1 each 0    glucose blood test strip use to check blood sugar 4 (Four) Times a Day. 100 each 5    Lancets (freestyle) lancets 1 each by Other route 4 (Four) Times a Day. 100 each 5     No current facility-administered medications on file prior to visit.        Common labs          12/18/2024    12:26 12/26/2024    19:07 1/9/2025    09:56   Common Labs   Glucose 79   103    BUN 9   5    Creatinine 0.46   0.50    Sodium 135   137    Potassium 4.2   4.2    Chloride 104   104    Calcium 8.6   8.3    Total Protein   5.6    Albumin 3.4   3.0    Total Bilirubin <0.2   <0.2    Alkaline Phosphatase 114   195    AST (SGOT) 19   19    ALT (SGPT) 14   23    WBC 10.25  8.01  7.9    Hemoglobin 11.3  9.4  11.0    Hematocrit 34.7  29.7  33.9    Platelets 245  194  263   "  Hemoglobin A1C 5.40            Past obstetric, gynecological, medical, surgical, family and social history reviewed.  Relevant lab work and imaging reviewed.    Review of systems  Constitutional:  denies fever, chills, malaise.   ENT/Mouth:  denies sore throat, tinnitus  Eyes: denies vision changes/pain  CV:  denies chest pain  Respiratory:  denies cough/SOB  GI:  denies N/V, diarrhea, abdominal pain.    :   denies dysuria  Skin:  denies lesions or pruritus   Neuro:  denies weakness, focal neurologic symptoms    Vitals:    25 1421   BP: 121/74   BP Location: Right arm   Pulse: 104   Temp: 97.8 °F (36.6 °C)   TempSrc: Temporal   Weight: 83 kg (183 lb)   Height: 165 cm (64.96\")         Estimated body mass index is 30.49 kg/m² as calculated from the following:    Height as of this encounter: 165 cm (64.96\").    Weight as of this encounter: 83 kg (183 lb).    PHYSICAL EXAM   General: No acute distress, pleasant, AAO x 3  Respiratory: No increased work of breathing, speaking in complete sentences without difficulty, symmetric chest rise  Cardiac: Regular rate   abdominal: Gravid, nontender  Extremities: No significant edema  Neuro/psych: Awake, Alert and oriented, appropriate mood/affect      ULTRASOUND   A full ultrasound report can be found under imaging tab  Cephalic presentation.  Posterior placenta.  JOVANI 15 cm, which is normal.  EFW 3633 g (75%, AC 32%)  BPP 8/8    ASSESSMENT/COUNSELIN y.o.   38w4d gestation (Estimated Date of Delivery: 25).     -Pregnancy  [ X ] stable  [   ] improving [  ] worsening    Diagnoses and all orders for this visit:    1. Gestational diabetes mellitus (GDM) in third trimester controlled on oral hypoglycemic drug (Primary)    2. Proteinuria affecting pregnancy in third trimester    Other orders  -     POC Glucose        GESTATIONAL DIABETES  Previously counseled--did not bring logs.  Encouraged to send later today.    Regimen:  Metformin 500 mg XR BID--stop " after delivery.    Will need 2 hour GTT postpartum.    ADVANCED MATERNAL AGE  NIPT wnl.  Weekly ANFS at 32 weeks    PROTEINURIA  Pt's BP has been normal along with preE labs.  Pt needs nephrology appt--scheduled for 2/20.       Onset of proteinuria from primary ob UA results indicates timing right around 20-21 weeks  Nephrology scheduled 2/20  Recommend patient to check blood pressure at home twice daily.  Monitor closely for pre-eclampsia.   She is aware to monitor for signs and symptoms or pre-eclampsia.  She is aware to report to EDMAR for BP elevated > 140/>90 x 2    Pre E precautions and fetal movement/labor precautions given.      USED  Entirety of today's encounter including patient interview, exam, and counseling performed with the aid of a medical Shailesh  via the telephone.      Summary of Plan  - Recommend emailing/fax/submitting her log weekly for review; to bring log to all [facility] office visits.  -Continue ADA diet with carbohydrate counting.    -Ambulatory blood pressure monitoring twice daily  -Delivery next week  -Intrapartum glucose monitoring is recommended, with a goal of .   -Scheduled for IOL 1/23    Follow-up: No follow up, pt delivering.      Thank you for the consult and opportunity to care for this patient.  Please feel free to reach out with any questions or concerns.      I spent 20 minutes caring for this patient on this date of service. This time includes time spent by me in the following activities: preparing for the visit, reviewing tests, obtaining and/or reviewing a separately obtained history, performing a medically appropriate examination and/or evaluation, counseling and educating the patient/family/caregiver and independently interpreting results and communicating that information with the patient/family/caregiver with greater than 50% spent in counseling and coordination of care.     3 minutes reading US.      Naomie Abernathy MD FACOG  Maternal  Fetal Medicine-[facility]  Office: [phone]  [email or web address]

## 2025-01-17 NOTE — PROGRESS NOTES
Pt reports that she is doing well and denies vaginal bleeding, contractions or LOF at this time. Reports active fetal movement. Reviewed when to call OB office or present to L&D for evaluation with symptoms such as decreased fetal movement, vaginal bleeding, LOF or ctxs. Pt verbalized understanding. Denies HA, visual changes or epigastric pain. Patient complains of vaginal pressure. She is not checking daily blood pressure as recommended.     Blood sugar logs in chart for provider to review.  Blood sugar taken in office with a value of 106.  Vitals:    01/17/25 1421   BP: 121/74   Pulse: 104   Temp: 97.8 °F (36.6 °C)

## 2025-01-22 ENCOUNTER — ROUTINE PRENATAL (OUTPATIENT)
Dept: OBSTETRICS AND GYNECOLOGY | Facility: CLINIC | Age: 35
End: 2025-01-22
Payer: MEDICAID

## 2025-01-22 VITALS — WEIGHT: 183.6 LBS | SYSTOLIC BLOOD PRESSURE: 102 MMHG | DIASTOLIC BLOOD PRESSURE: 72 MMHG | BODY MASS INDEX: 30.59 KG/M2

## 2025-01-22 DIAGNOSIS — Z3A.39 39 WEEKS GESTATION OF PREGNANCY: Primary | ICD-10-CM

## 2025-01-22 DIAGNOSIS — O09.529 ANTEPARTUM MULTIGRAVIDA OF ADVANCED MATERNAL AGE: ICD-10-CM

## 2025-01-22 DIAGNOSIS — O24.415 GESTATIONAL DIABETES MELLITUS (GDM) IN THIRD TRIMESTER CONTROLLED ON ORAL HYPOGLYCEMIC DRUG: ICD-10-CM

## 2025-01-22 DIAGNOSIS — O12.13 PROTEINURIA AFFECTING PREGNANCY IN THIRD TRIMESTER: ICD-10-CM

## 2025-01-22 LAB
GLUCOSE UR STRIP-MCNC: NEGATIVE MG/DL
PROT UR STRIP-MCNC: ABNORMAL MG/DL

## 2025-01-23 ENCOUNTER — HOSPITAL ENCOUNTER (OUTPATIENT)
Dept: LABOR AND DELIVERY | Facility: HOSPITAL | Age: 35
Discharge: HOME | End: 2025-01-23
Payer: MEDICAID

## 2025-01-23 ENCOUNTER — ANESTHESIA (OUTPATIENT)
Dept: LABOR AND DELIVERY | Facility: HOSPITAL | Age: 35
End: 2025-01-23
Payer: MEDICAID

## 2025-01-23 ENCOUNTER — ANESTHESIA EVENT (OUTPATIENT)
Dept: LABOR AND DELIVERY | Facility: HOSPITAL | Age: 35
End: 2025-01-23
Payer: MEDICAID

## 2025-01-23 ENCOUNTER — HOSPITAL ENCOUNTER (INPATIENT)
Facility: HOSPITAL | Age: 35
LOS: 3 days | Discharge: HOME | End: 2025-01-26
Attending: OBSTETRICS & GYNECOLOGY | Admitting: OBSTETRICS & GYNECOLOGY
Payer: MEDICAID

## 2025-01-23 PROBLEM — O24.414 GESTATIONAL DIABETES MELLITUS (GDM) REQUIRING INSULIN: Status: ACTIVE | Noted: 2025-01-23

## 2025-01-23 PROBLEM — O44.40 LOW LYING PLACENTA NOS OR WITHOUT HEMORRHAGE, UNSPECIFIED TRIMESTER: Status: RESOLVED | Noted: 2024-09-11 | Resolved: 2025-01-23

## 2025-01-23 PROBLEM — Z34.90 PREGNANCY: Status: RESOLVED | Noted: 2024-12-26 | Resolved: 2025-01-23

## 2025-01-23 LAB
ABO GROUP BLD: NORMAL
ALBUMIN SERPL-MCNC: 2.7 G/DL (ref 3.5–5.2)
ALBUMIN UR-MCNC: 96.3 MG/DL
ALBUMIN/GLOB SERPL: 0.8 G/DL
ALP SERPL-CCNC: 161 U/L (ref 39–117)
ALT SERPL W P-5'-P-CCNC: 9 U/L (ref 1–33)
ANION GAP SERPL CALCULATED.3IONS-SCNC: 12.3 MMOL/L (ref 5–15)
AST SERPL-CCNC: 18 U/L (ref 1–32)
BACTERIA UR QL AUTO: ABNORMAL /HPF
BASOPHILS # BLD AUTO: 0.09 10*3/MM3 (ref 0–0.2)
BASOPHILS NFR BLD AUTO: 1.1 % (ref 0–1.5)
BILIRUB SERPL-MCNC: <0.2 MG/DL (ref 0–1.2)
BILIRUB UR QL STRIP: NEGATIVE
BLD GP AB SCN SERPL QL: NEGATIVE
BUN SERPL-MCNC: 10 MG/DL (ref 6–20)
BUN/CREAT SERPL: 23.8 (ref 7–25)
C3 SERPL-MCNC: 179 MG/DL (ref 82–167)
C4 SERPL-MCNC: 22 MG/DL (ref 14–44)
CALCIUM SPEC-SCNC: 8.5 MG/DL (ref 8.6–10.5)
CHLORIDE SERPL-SCNC: 102 MMOL/L (ref 98–107)
CLARITY UR: CLEAR
CO2 SERPL-SCNC: 18.7 MMOL/L (ref 22–29)
COLOR UR: YELLOW
CREAT SERPL-MCNC: 0.42 MG/DL (ref 0.57–1)
CREAT UR-MCNC: 24.6 MG/DL
CREAT UR-MCNC: 25.1 MG/DL
DEPRECATED RDW RBC AUTO: 40.3 FL (ref 37–54)
EGFRCR SERPLBLD CKD-EPI 2021: 131 ML/MIN/1.73
EOSINOPHIL # BLD AUTO: 0.12 10*3/MM3 (ref 0–0.4)
EOSINOPHIL NFR BLD AUTO: 1.5 % (ref 0.3–6.2)
ERYTHROCYTE [DISTWIDTH] IN BLOOD BY AUTOMATED COUNT: 13.5 % (ref 12.3–15.4)
GLOBULIN UR ELPH-MCNC: 3.4 GM/DL
GLUCOSE SERPL-MCNC: 81 MG/DL (ref 65–99)
GLUCOSE UR STRIP-MCNC: NEGATIVE MG/DL
HCT VFR BLD AUTO: 32.5 % (ref 34–46.6)
HGB BLD-MCNC: 11 G/DL (ref 12–15.9)
HGB UR QL STRIP.AUTO: ABNORMAL
HYALINE CASTS UR QL AUTO: ABNORMAL /LPF
IMM GRANULOCYTES # BLD AUTO: 0.05 10*3/MM3 (ref 0–0.05)
IMM GRANULOCYTES NFR BLD AUTO: 0.6 % (ref 0–0.5)
KETONES UR QL STRIP: NEGATIVE
LEUKOCYTE ESTERASE UR QL STRIP.AUTO: NEGATIVE
LYMPHOCYTES # BLD AUTO: 3.22 10*3/MM3 (ref 0.7–3.1)
LYMPHOCYTES NFR BLD AUTO: 39.2 % (ref 19.6–45.3)
MCH RBC QN AUTO: 28.1 PG (ref 26.6–33)
MCHC RBC AUTO-ENTMCNC: 33.8 G/DL (ref 31.5–35.7)
MCV RBC AUTO: 83.1 FL (ref 79–97)
MICROALBUMIN/CREAT UR: 3836.7 MG/G (ref 0–29)
MONOCYTES # BLD AUTO: 0.8 10*3/MM3 (ref 0.1–0.9)
MONOCYTES NFR BLD AUTO: 9.7 % (ref 5–12)
NEUTROPHILS NFR BLD AUTO: 3.93 10*3/MM3 (ref 1.7–7)
NEUTROPHILS NFR BLD AUTO: 47.9 % (ref 42.7–76)
NITRITE UR QL STRIP: NEGATIVE
NRBC BLD AUTO-RTO: 0 /100 WBC (ref 0–0.2)
PH UR STRIP.AUTO: 7.5 [PH] (ref 5–8)
PLATELET # BLD AUTO: 285 10*3/MM3 (ref 140–450)
PMV BLD AUTO: 11.6 FL (ref 6–12)
POTASSIUM SERPL-SCNC: 4.2 MMOL/L (ref 3.5–5.2)
PROT ?TM UR-MCNC: 197.2 MG/DL
PROT SERPL-MCNC: 6.1 G/DL (ref 6–8.5)
PROT UR QL STRIP: ABNORMAL
PROT/CREAT UR: 8016.3 MG/G CREA (ref 0–200)
RBC # BLD AUTO: 3.91 10*6/MM3 (ref 3.77–5.28)
RBC # UR STRIP: ABNORMAL /HPF
REF LAB TEST METHOD: ABNORMAL
RH BLD: POSITIVE
SODIUM SERPL-SCNC: 133 MMOL/L (ref 136–145)
SP GR UR STRIP: 1.01 (ref 1–1.03)
SQUAMOUS #/AREA URNS HPF: ABNORMAL /HPF
T&S EXPIRATION DATE: NORMAL
TREPONEMA PALLIDUM IGG+IGM AB [PRESENCE] IN SERUM OR PLASMA BY IMMUNOASSAY: NORMAL
UROBILINOGEN UR QL STRIP: ABNORMAL
WBC # UR STRIP: ABNORMAL /HPF
WBC NRBC COR # BLD AUTO: 8.21 10*3/MM3 (ref 3.4–10.8)

## 2025-01-23 PROCEDURE — 82570 ASSAY OF URINE CREATININE: CPT | Performed by: OBSTETRICS & GYNECOLOGY

## 2025-01-23 PROCEDURE — 82043 UR ALBUMIN QUANTITATIVE: CPT

## 2025-01-23 PROCEDURE — 86235 NUCLEAR ANTIGEN ANTIBODY: CPT

## 2025-01-23 PROCEDURE — 86037 ANCA TITER EACH ANTIBODY: CPT

## 2025-01-23 PROCEDURE — 82784 ASSAY IGA/IGD/IGG/IGM EACH: CPT

## 2025-01-23 PROCEDURE — 84156 ASSAY OF PROTEIN URINE: CPT | Performed by: OBSTETRICS & GYNECOLOGY

## 2025-01-23 PROCEDURE — 83516 IMMUNOASSAY NONANTIBODY: CPT

## 2025-01-23 PROCEDURE — 86334 IMMUNOFIX E-PHORESIS SERUM: CPT

## 2025-01-23 PROCEDURE — 81001 URINALYSIS AUTO W/SCOPE: CPT

## 2025-01-23 PROCEDURE — 85025 COMPLETE CBC W/AUTO DIFF WBC: CPT | Performed by: OBSTETRICS & GYNECOLOGY

## 2025-01-23 PROCEDURE — 86780 TREPONEMA PALLIDUM: CPT | Performed by: OBSTETRICS & GYNECOLOGY

## 2025-01-23 PROCEDURE — 86850 RBC ANTIBODY SCREEN: CPT | Performed by: OBSTETRICS & GYNECOLOGY

## 2025-01-23 PROCEDURE — 83521 IG LIGHT CHAINS FREE EACH: CPT

## 2025-01-23 PROCEDURE — 86901 BLOOD TYPING SEROLOGIC RH(D): CPT | Performed by: OBSTETRICS & GYNECOLOGY

## 2025-01-23 PROCEDURE — 86160 COMPLEMENT ANTIGEN: CPT

## 2025-01-23 PROCEDURE — 80053 COMPREHEN METABOLIC PANEL: CPT | Performed by: OBSTETRICS & GYNECOLOGY

## 2025-01-23 PROCEDURE — 3E033VJ INTRODUCTION OF OTHER HORMONE INTO PERIPHERAL VEIN, PERCUTANEOUS APPROACH: ICD-10-PCS | Performed by: OBSTETRICS & GYNECOLOGY

## 2025-01-23 PROCEDURE — 25810000003 LACTATED RINGERS PER 1000 ML: Performed by: OBSTETRICS & GYNECOLOGY

## 2025-01-23 PROCEDURE — 59200 INSERT CERVICAL DILATOR: CPT | Performed by: OBSTETRICS & GYNECOLOGY

## 2025-01-23 PROCEDURE — 25010000002 PENICILLIN G POTASSIUM PER 600000 UNITS: Performed by: OBSTETRICS & GYNECOLOGY

## 2025-01-23 PROCEDURE — 86900 BLOOD TYPING SEROLOGIC ABO: CPT | Performed by: OBSTETRICS & GYNECOLOGY

## 2025-01-23 PROCEDURE — 10907ZC DRAINAGE OF AMNIOTIC FLUID, THERAPEUTIC FROM PRODUCTS OF CONCEPTION, VIA NATURAL OR ARTIFICIAL OPENING: ICD-10-PCS | Performed by: OBSTETRICS & GYNECOLOGY

## 2025-01-23 PROCEDURE — 86225 DNA ANTIBODY NATIVE: CPT

## 2025-01-23 RX ORDER — LIDOCAINE HYDROCHLORIDE 10 MG/ML
0.5 INJECTION, SOLUTION INFILTRATION; PERINEURAL ONCE AS NEEDED
Status: DISCONTINUED | OUTPATIENT
Start: 2025-01-23 | End: 2025-01-24 | Stop reason: HOSPADM

## 2025-01-23 RX ORDER — TERBUTALINE SULFATE 1 MG/ML
0.25 INJECTION, SOLUTION SUBCUTANEOUS AS NEEDED
Status: DISCONTINUED | OUTPATIENT
Start: 2025-01-23 | End: 2025-01-24 | Stop reason: HOSPADM

## 2025-01-23 RX ORDER — ONDANSETRON 4 MG/1
4 TABLET, ORALLY DISINTEGRATING ORAL EVERY 6 HOURS PRN
Status: DISCONTINUED | OUTPATIENT
Start: 2025-01-23 | End: 2025-01-24 | Stop reason: HOSPADM

## 2025-01-23 RX ORDER — ACETAMINOPHEN 325 MG/1
650 TABLET ORAL EVERY 4 HOURS PRN
Status: DISCONTINUED | OUTPATIENT
Start: 2025-01-23 | End: 2025-01-24 | Stop reason: HOSPADM

## 2025-01-23 RX ORDER — FENTANYL/ROPIVACAINE/NS/PF 2MCG/ML-.2
10 PLASTIC BAG, INJECTION (ML) INJECTION CONTINUOUS
Status: DISCONTINUED | OUTPATIENT
Start: 2025-01-23 | End: 2025-01-25

## 2025-01-23 RX ORDER — FAMOTIDINE 10 MG/ML
20 INJECTION, SOLUTION INTRAVENOUS EVERY 12 HOURS PRN
Status: DISCONTINUED | OUTPATIENT
Start: 2025-01-23 | End: 2025-01-24 | Stop reason: HOSPADM

## 2025-01-23 RX ORDER — MAGNESIUM CARB/ALUMINUM HYDROX 105-160MG
30 TABLET,CHEWABLE ORAL ONCE
Status: DISCONTINUED | OUTPATIENT
Start: 2025-01-23 | End: 2025-01-24 | Stop reason: HOSPADM

## 2025-01-23 RX ORDER — SODIUM CHLORIDE 0.9 % (FLUSH) 0.9 %
10 SYRINGE (ML) INJECTION AS NEEDED
Status: DISCONTINUED | OUTPATIENT
Start: 2025-01-23 | End: 2025-01-24 | Stop reason: HOSPADM

## 2025-01-23 RX ORDER — ONDANSETRON 2 MG/ML
4 INJECTION INTRAMUSCULAR; INTRAVENOUS EVERY 6 HOURS PRN
Status: DISCONTINUED | OUTPATIENT
Start: 2025-01-23 | End: 2025-01-24 | Stop reason: HOSPADM

## 2025-01-23 RX ORDER — MORPHINE SULFATE 2 MG/ML
2 INJECTION, SOLUTION INTRAMUSCULAR; INTRAVENOUS
Status: DISCONTINUED | OUTPATIENT
Start: 2025-01-23 | End: 2025-01-24 | Stop reason: HOSPADM

## 2025-01-23 RX ORDER — DIPHENHYDRAMINE HYDROCHLORIDE 50 MG/ML
12.5 INJECTION INTRAMUSCULAR; INTRAVENOUS EVERY 8 HOURS PRN
Status: DISCONTINUED | OUTPATIENT
Start: 2025-01-23 | End: 2025-01-24 | Stop reason: HOSPADM

## 2025-01-23 RX ORDER — OXYTOCIN/0.9 % SODIUM CHLORIDE 30/500 ML
1-20 PLASTIC BAG, INJECTION (ML) INTRAVENOUS
Status: DISCONTINUED | OUTPATIENT
Start: 2025-01-23 | End: 2025-01-25

## 2025-01-23 RX ORDER — EPHEDRINE SULFATE 50 MG/ML
5 INJECTION, SOLUTION INTRAVENOUS
Status: DISCONTINUED | OUTPATIENT
Start: 2025-01-23 | End: 2025-01-24 | Stop reason: HOSPADM

## 2025-01-23 RX ORDER — SODIUM CHLORIDE, SODIUM LACTATE, POTASSIUM CHLORIDE, CALCIUM CHLORIDE 600; 310; 30; 20 MG/100ML; MG/100ML; MG/100ML; MG/100ML
50 INJECTION, SOLUTION INTRAVENOUS CONTINUOUS
Status: ACTIVE | OUTPATIENT
Start: 2025-01-23 | End: 2025-01-24

## 2025-01-23 RX ORDER — MISOPROSTOL 100 MCG
25 TABLET ORAL EVERY 4 HOURS
Status: DISCONTINUED | OUTPATIENT
Start: 2025-01-23 | End: 2025-01-23

## 2025-01-23 RX ORDER — SODIUM CHLORIDE 0.9 % (FLUSH) 0.9 %
10 SYRINGE (ML) INJECTION EVERY 12 HOURS SCHEDULED
Status: DISCONTINUED | OUTPATIENT
Start: 2025-01-23 | End: 2025-01-24 | Stop reason: HOSPADM

## 2025-01-23 RX ORDER — ONDANSETRON 2 MG/ML
4 INJECTION INTRAMUSCULAR; INTRAVENOUS ONCE AS NEEDED
Status: DISCONTINUED | OUTPATIENT
Start: 2025-01-23 | End: 2025-01-24 | Stop reason: HOSPADM

## 2025-01-23 RX ORDER — FAMOTIDINE 20 MG/1
20 TABLET, FILM COATED ORAL EVERY 12 HOURS PRN
Status: DISCONTINUED | OUTPATIENT
Start: 2025-01-23 | End: 2025-01-24 | Stop reason: HOSPADM

## 2025-01-23 RX ORDER — PENICILLIN G 3000000 [IU]/50ML
3 INJECTION, SOLUTION INTRAVENOUS EVERY 4 HOURS
Status: DISCONTINUED | OUTPATIENT
Start: 2025-01-23 | End: 2025-01-24 | Stop reason: HOSPADM

## 2025-01-23 RX ADMIN — PENICILLIN G 3 MILLION UNITS: 3000000 INJECTION, SOLUTION INTRAVENOUS at 20:07

## 2025-01-23 RX ADMIN — Medication 25 MCG: at 12:55

## 2025-01-23 RX ADMIN — SODIUM CHLORIDE 5 MILLION UNITS: 900 INJECTION INTRAVENOUS at 16:17

## 2025-01-23 RX ADMIN — Medication 1 MILLI-UNITS/MIN: at 16:54

## 2025-01-23 RX ADMIN — SODIUM CHLORIDE, POTASSIUM CHLORIDE, SODIUM LACTATE AND CALCIUM CHLORIDE 125 ML/HR: 600; 310; 30; 20 INJECTION, SOLUTION INTRAVENOUS at 16:17

## 2025-01-23 NOTE — PROGRESS NOTES
PATIENT ASSESSMENT FOR ADMINISTRATION OF NITROUS OXIDE FOR PAIN MANAGEMENT       Nitrous Oxide Assessment      This document serves as an assessment for pain management use of Nitrous Oxide in Labor & Delivery.    The following medical conditions are contraindicated in the use of Nitrous Oxide.       - Physical inability to hold own face mask in place      - Pernicious anemia or B12 deficiency     - Daily Methadone or Buprenorphine use (Subtex or Suboxone)     - Any concern for poor fetal status      - Current diagnosis of sleep apnea      - Recent intraocular surgery      - Increased intraocular pressure     - Increased intracranial pressure      - Pulmonary Hypertension      - Recent bowel obstruction      - Recent Middle ear surgery       I have assessed and determined that Anila Sepulveda is a candidate for the use of Nitrous Oxide for pain management.     ** ACOG PB#209 Based on the concern for potential maternal adverse consequences, co-administration of systemic opioids or sedatives/hypnotics and inhaled nitrous oxide for labor analgesia is not recommended.    Medical conditions may arise or change during the course of pregnancy, labor, delivery and/or postpartum period that may void this assessment tool making the patient no longer a candidate for nitrous oxide use.  Refer to Nitrous Oxide for Analgesia in the Obstetrical patient policy for additional contraindications that may deem the patient not a candidate.     Yossi Torres MD  1/23/2025  14:30 EST

## 2025-01-23 NOTE — NURSING NOTE
RN spoke with [facility], informed of patient situation and need of baby supplies. Patient has a support person/sponsor at the bedside who states she has been in contact with [facility] and a  who will assist in getting the patient necessary baby supplies for when she is discharged. Patient states that she has no baby supplies at home and needs car seat, crib, diapers, bottles, formula, etc. RN placed  consult for patient.

## 2025-01-23 NOTE — CONSULTS
[facility] Consult Note      Patient Name: Anila Sepulveda  : 1990  MRN: [id number]  Primary Care Physician:  Frannie Vanegas APRN  Referring Physician: Frannie Keyes*  Date of admission: 2025    Subjective     Reason for Consult:  Nephrotic proteinuria     HPI:   Anila Sepulveda is a 34 y.o. female with normal kidney function, recently diagnosed gestational diabetes,  8 para 3-0-4-3 at 39.5 weeks gestational age, presented to the hospital today for scheduled labor induction, found to have marked nephrotic proteinuria (8-9 g/g).  Per chart review, patient has been having 2.5-2.7 g/g proteinuria since last December (that was the 1st time she ever was told having proteinuria).  Renal function normal at baseline.     Patient does not have hypertension, BP has been well-controlled during entire pregnancy. Denies gross hematuria, diagnosed with gestational diabetes 7 months ago. Reports having BLE edema after sitting for a long period of time (ie during traveling) x10 yrs. +foamy urine.  Denies family history of kidney disease.    Review of Systems:   14 point review of systems is otherwise negative except for mentioned above on HPI    Personal History     Past Medical History:   Diagnosis Date    Bilateral mastodynia 2018    Complete spontaneous  without complication 2024:  - LMP 10/28/2023  - ED visit 2024 with bleeding for 2 days. BSUS showed GS, no definitive YS. Thickened endometrium.   - Quant 1204 on 24  - Patient report that since her ED visit, she has passed tissue. Showed pics during clinic visit today.  - Bleeding is now minimal with light cramping.    - PNVs sent to pharmacy since pt wishes to get pregnant again. Advised to wait for one m    COVID-19 2024    Irregular uterine bleeding     Spontaneous miscarriage 2024       Past Surgical History:   Procedure Laterality Date    D & C HYSTEROSCOPY N/A  3/28/2024    Procedure: DILATATION AND CURETTAGE HYSTEROSCOPY WITH MYOSURE RESECTION OF UTERINE MASS;  Surgeon: Yossi Torres MD;  Location: [facility] MAIN OR;  Service: Obstetrics/Gynecology;  Laterality: N/A;    HYSTEROSCOPY         Family History: family history is not on file.    Social History:  reports that she has never smoked. She has never been exposed to tobacco smoke. She has never used smokeless tobacco. She reports that she does not drink alcohol and does not use drugs.    Home Medications:  Prior to Admission medications    Medication Sig Start Date End Date Taking? Authorizing Provider   metFORMIN ER (GLUCOPHAGE-XR) 500 MG 24 hr tablet Take 1 tablet by mouth 2 (Two) Times a Day With Meals. 12/18/24  Yes Brittaney Mon APRN   benzonatate (TESSALON) 200 MG capsule Take 1 capsule by mouth 3 (Three) Times a Day As Needed for Cough.  Patient not taking: Reported on 1/17/2025 12/27/24   Marshall Leonard MD   Blood Glucose Monitoring Suppl (FreeStyle Lite) w/Device kit  11/22/24   Provider, MD Linda   Blood Pressure Monitoring (Blood Pressure Monitor Automat) device Use to check blood pressure twice daily  Patient not taking: Reported on 1/22/2025 12/18/24   Brittaney Mon APRN   glucose blood test strip use to check blood sugar 4 (Four) Times a Day. 12/27/24   Marshall Leonard MD   Lancets (freestyle) lancets 1 each by Other route 4 (Four) Times a Day. 12/6/24   Yossi Torres MD       Allergies:  No Known Allergies    Objective     Vitals:   Temp:  [98 °F (36.7 °C)] 98 °F (36.7 °C)  Heart Rate:  [88-89] 88  Resp:  [17] 17  BP: (121-127)/(73-88) 121/73    Intake/Output Summary (Last 24 hours) at 1/23/2025 1730  Last data filed at 1/23/2025 1654  Gross per 24 hour   Intake --   Output 2400 ml   Net -2400 ml       Physical Exam:   Constitutional: Awake, alert, no acute distress.  HEENT: Sclera anicteric, no conjunctival injection  Neck: Supple, no thyromegaly, no  lymphadenopathy, trachea at midline, no JVD  Respiratory: Clear to auscultation bilaterally, nonlabored respiration  Cardiovascular: RRR, no murmurs, no rubs, no carotid bruit  Gastrointestinal: fetal monitor in place , abdomen is nontender   : No palpable bladder  Musculoskeletal: 1-2+ BLE edema, L>R, no clubbing or cyanosis  Psychiatric: Appropriate affect, cooperative  Neurologic: Oriented x3, moving all extremities, normal speech and mental status, no asterixis  Skin: Warm and dry       Scheduled Meds:     lactated ringers, 1,000 mL, Intravenous, Once  mineral oil, 30 mL, Topical, Once  penicillin g (potassium), 3 Million Units, Intravenous, Q4H  sodium chloride, 10 mL, Intravenous, Q12H      IV Meds:   fentaNYL-ropivacaine-NaCl, 10 mL/hr  lactated ringers, 50 mL/hr, Last Rate: 125 mL/hr (01/23/25 1617)  oxytocin, 1-20 kristina-units/min, Last Rate: 1 kristina-units/min (01/23/25 1654)        Results Reviewed:   I have personally reviewed the results from the time of this admission to 1/23/2025 17:30 EST     Lab Results   Component Value Date    GLUCOSE 81 01/23/2025    CALCIUM 8.5 (L) 01/23/2025     (L) 01/23/2025    K 4.2 01/23/2025    CO2 18.7 (L) 01/23/2025     01/23/2025    BUN 10 01/23/2025    CREATININE 0.42 (L) 01/23/2025    BCR 23.8 01/23/2025    ANIONGAP 12.3 01/23/2025      Lab Results   Component Value Date    ALBUMIN 2.7 (L) 01/23/2025           Assessment / Plan       Gestational diabetes mellitus (GDM) requiring insulin    Antepartum multigravida of advanced maternal age    Proteinuria affecting pregnancy in third trimester      ASSESSMENT:  Nephrotic range proteinuria, progressively worse since December '24 (UPCR 2.5g/g->9g/g); no evidence of preeclampsia given normal blood pressure.  renal function stable at baseline, low serum albumin, 2.7. Diagnosed with gestational diabetes 7 months ago, A1c 5.4 on 12/18/2024,.  Needs to rule out glomerulonephritis (hepatitis B&C negative July '24;  HIV negative November '24).  May require kidney biopsy after delivery  Gestational diabetes, BG well-controlled  Intrapartum multigravida of advanced maternal age, being induced today  GBS positive, s/p penicillin  Anemia    PLAN:  Check C 3, C 4, ANCA, TARIK,, immunofixation, PLA2R and free light chains, cryoglobulins  Check UA for hematuria  Hold on starting ACE or ARB at the time  Surveillance labs    Thank you for involving us in the care of Anila Sepulveda.  Please feel free to call with any questions.    Forest Harp MD  01/23/25  17:30 [facility]    [facility]  [phone]      Please note that portions of this note were completed with a voice recognition program.

## 2025-01-23 NOTE — PROCEDURES
Procedure: Placement of cervical ripening balloon  Practitioner: Yossi Torres MD  Preprocedure diagnosis: 34-year-old  8 para 3-0-4-3 at 39-3/7 weeks gestational age here for labor induction/cervical ripening  Post procedure diagnosis: Same  Anesthesia: None  Pathology: None  Estimated blood loss: Less than 5 mL  Procedure in detail:  Patient counseled about placement of cervical ripening balloon.  Risk, benefits, alternatives discussed with the patient.  All questions answered and she wishes to proceed.  Timeout was performed by all team members and the patient.  Patient was doubly identified and the correct patient and procedure was confirmed.  Digital exam was performed with the findings as previously documented.  First, attempted to place the balloon digitally; the patient was having difficulty tolerating the exam.  I thought she might be more amenable to insertion with the use of the speculum.  Next, sterile lubricated speculum was placed in the vagina and the cervix was brought into view.  The Cook cervical ripening balloon was then grasped at the distal end using a ring forceps and the catheter was passed through the cervix beyond the internal os.  The uterine balloon was then inflated with 60 cc of sterile water.  Patient tolerated this well with minimal discomfort.  All instruments were removed from the vagina.  The balloon was placed on gentle traction and taped to the patient's leg.  I was present for the entire procedure.  There were no complications.

## 2025-01-23 NOTE — H&P
[facility]   HISTORY AND PHYSICAL    Patient Name: Anila Sepulveda  : 1990  MRN: [id number]  Primary Care Physician:  Frannie Vanegas APRN  Date of admission: 2025    Subjective   Subjective     Chief Complaint: Labor induction    HPI:    Anila Sepulveda is a 34 y.o. female presents today for scheduled labor induction due to gestational diabetes.  She is also GBS positive.  She has a history of 3 prior vaginal deliveries without complication.    During this pregnancy, patient also developed marked proteinuria.  She never had any blood pressure elevations or other evidence of preeclampsia.  Proteinuria appears to be isolated.  We had sent referral to nephrology, but patient was never able to get an appointment.    Review of Systems     Constitutional: No fevers, chills, sweats   Eye: No recent visual problems, denies blurry vision   HEENT: No ear pain, nasal congestion, sore throat, voice changes   Respiratory: No shortness of breath, cough, pain on breathing   Cardiovascular: No Chest pain, palpitations   Gastrointestinal: No nausea, vomiting, diarrhea, constipation   Genitourinary: No hematuria, dysuria, lesions on genitalia   Hema/Lymph: Negative for bruising, no edema   Endocrine: Negative for excessive thirst, excessive hunger, heat or cold intolerance   Musculoskeletal: No joint pain, muscle pain, decreased range of motion   Integumentary: No rash, pruritus, abrasions, lesions   Neurologic: No weakness, numbness, headaches   Psychiatric: No anxiety, depression, mood changes         Personal History     Past Medical History:   Diagnosis Date    Bilateral mastodynia 2018    Complete spontaneous  without complication 2024:  - LMP 10/28/2023  - ED visit 2024 with bleeding for 2 days. BSUS showed GS, no definitive YS. Thickened endometrium.   - Quant 1204 on 24  - Patient report that since her ED visit, she has passed tissue. Showed pics during clinic  visit today.  - Bleeding is now minimal with light cramping.    - PNVs sent to pharmacy since pt wishes to get pregnant again. Advised to wait for one m    COVID-19 12/18/2024    Irregular uterine bleeding     Spontaneous miscarriage 01/2024       Past Surgical History:   Procedure Laterality Date    D & C HYSTEROSCOPY N/A 3/28/2024    Procedure: DILATATION AND CURETTAGE HYSTEROSCOPY WITH MYOSURE RESECTION OF UTERINE MASS;  Surgeon: Yossi Torres MD;  Location: [facility] OR;  Service: Obstetrics/Gynecology;  Laterality: N/A;    HYSTEROSCOPY         Family History: family history is not on file. Otherwise pertinent FHx was reviewed and not pertinent to current issue.    Social History:  reports that she has never smoked. She has never been exposed to tobacco smoke. She has never used smokeless tobacco. She reports that she does not drink alcohol and does not use drugs.    Home Medications:  Blood Pressure Monitor Automat, FreeStyle Lite, benzonatate, freestyle, glucose blood, and metFORMIN ER      Allergies:  No Known Allergies    Objective   Objective     Vitals:   Temp:  [98 °F (36.7 °C)] 98 °F (36.7 °C)  Heart Rate:  [89] 89  Resp:  [17] 17  BP: (102-127)/(72-88) 127/88  Physical Exam     General: No acute distress, awake and oriented x3   HEENT: Normocephalic atraumatic, moist mucous membranes   Eyes: Pupils equal round reactive to light and accommodation, extraocular muscles intact   Respiratory: Normal work of breathing, good air movement   Cardiovascular: Regular rate and rhythm, no murmurs   Abdomen: Gravid, soft, nontender   Pelvic exam performed in the presence of female RN chaperone.  Patient's friend also present during the exam.  Patient provided verbal consent to proceed with the exam.   Pelvic exam: Normal external female genitalia, no lesions, no leakage of fluid   Cervix: 1 cm dilated/50% effaced/-2Cephalic by palpation of sutures   Extremities: No calf tenderness, no lower extremity  edema   Psychiatric: Good judgment and insight, normal affect and mood   Neurologic: Cranial nerves II through XII intact, no deficits   Skin: No rashes or lesions       Result Review    Result Review:  I have personally reviewed the results from the time of this admission to 1/23/2025 14:23 EST and agree with these findings:  [x]  Laboratory list / accordion  [x]  Microbiology  [x]  Radiology  []  EKG/Telemetry   []  Cardiology/Vascular   []  Pathology  []  Old records  [x]  Other: NST  Most notable findings include:     Admission on 01/23/2025   Component Date Value Ref Range Status    Treponemal AB Total 01/23/2025 Non-Reactive  Non-Reactive Final    ABO Type 01/23/2025 O   Final    RH type 01/23/2025 Positive   Final    Antibody Screen 01/23/2025 Negative   Final    T&S Expiration Date 01/23/2025 1/26/2025 11:59:59 PM   Final    Glucose 01/23/2025 81  65 - 99 mg/dL Final    BUN 01/23/2025 10  6 - 20 mg/dL Final    Creatinine 01/23/2025 0.42 (L)  0.57 - 1.00 mg/dL Final    Sodium 01/23/2025 133 (L)  136 - 145 mmol/L Final    Potassium 01/23/2025 4.2  3.5 - 5.2 mmol/L Final    Chloride 01/23/2025 102  98 - 107 mmol/L Final    CO2 01/23/2025 18.7 (L)  22.0 - 29.0 mmol/L Final    Calcium 01/23/2025 8.5 (L)  8.6 - 10.5 mg/dL Final    Total Protein 01/23/2025 6.1  6.0 - 8.5 g/dL Final    Albumin 01/23/2025 2.7 (L)  3.5 - 5.2 g/dL Final    ALT (SGPT) 01/23/2025 9  1 - 33 U/L Final    AST (SGOT) 01/23/2025 18  1 - 32 U/L Final    Alkaline Phosphatase 01/23/2025 161 (H)  39 - 117 U/L Final    Total Bilirubin 01/23/2025 <0.2  0.0 - 1.2 mg/dL Final    Globulin 01/23/2025 3.4  gm/dL Final    A/G Ratio 01/23/2025 0.8  g/dL Final    BUN/Creatinine Ratio 01/23/2025 23.8  7.0 - 25.0 Final    Anion Gap 01/23/2025 12.3  5.0 - 15.0 mmol/L Final    eGFR 01/23/2025 131.0  >60.0 mL/min/1.73 Final    WBC 01/23/2025 8.21  3.40 - 10.80 10*3/mm3 Final    RBC 01/23/2025 3.91  3.77 - 5.28 10*6/mm3 Final    Hemoglobin 01/23/2025 11.0 (L)   12.0 - 15.9 g/dL Final    Hematocrit 2025 32.5 (L)  34.0 - 46.6 % Final    MCV 2025 83.1  79.0 - 97.0 fL Final    MCH 2025 28.1  26.6 - 33.0 pg Final    MCHC 2025 33.8  31.5 - 35.7 g/dL Final    RDW 2025 13.5  12.3 - 15.4 % Final    RDW-SD 2025 40.3  37.0 - 54.0 fl Final    MPV 2025 11.6  6.0 - 12.0 fL Final    Platelets 2025 285  140 - 450 10*3/mm3 Final    Neutrophil % 2025 47.9  42.7 - 76.0 % Final    Lymphocyte % 2025 39.2  19.6 - 45.3 % Final    Monocyte % 2025 9.7  5.0 - 12.0 % Final    Eosinophil % 2025 1.5  0.3 - 6.2 % Final    Basophil % 2025 1.1  0.0 - 1.5 % Final    Immature Grans % 2025 0.6 (H)  0.0 - 0.5 % Final    Neutrophils, Absolute 2025 3.93  1.70 - 7.00 10*3/mm3 Final    Lymphocytes, Absolute 2025 3.22 (H)  0.70 - 3.10 10*3/mm3 Final    Monocytes, Absolute 2025 0.80  0.10 - 0.90 10*3/mm3 Final    Eosinophils, Absolute 2025 0.12  0.00 - 0.40 10*3/mm3 Final    Basophils, Absolute 2025 0.09  0.00 - 0.20 10*3/mm3 Final    Immature Grans, Absolute 2025 0.05  0.00 - 0.05 10*3/mm3 Final    nRBC 2025 0.0  0.0 - 0.2 /100 WBC Final         Component  Ref Range & Units    Strep Gp B SUNSHINE  Negative Positive Abnormal         MFM ultrasound on 2025:     Cephalic presentation.  Posterior placenta.  JOVANI 15 cm, which is normal.  EFW 3633 g (75%, AC 32%)  BPP 8/8    NST:  130/reactive/moderate variability/no decelerations  Tocometry: Contractions every 1-4 minutes    Assessment & Plan   Assessment / Plan     Brief Patient Summary:  Anila Sepulveda is a 34 y.o. female  8 para 3-0-4-3 at 39-3/7 weeks gestational age  2.  Gestational diabetes, controlled on oral hypoglycemic agent  3.  Advanced maternal age  4.  GBS positive  5.  Fetal heart tones category 1    Active Hospital Problems:  Active Hospital Problems    Diagnosis     **Gestational diabetes mellitus (GDM) requiring insulin      Proteinuria affecting pregnancy in third trimester     Antepartum multigravida of advanced maternal age      Plan:   1. Patient and family extensively counseled about indications for labor induction. Discussed risks of induction/delivery including bleeding, blood transfusion, infection, perineal laceration, laceration repair, pain, scar, failure of repair, urinary retention, DVT, anesthesia complications, temporary or permanent nerve damage, organ failure, and even death. Also discussed possibility of  delivery and associated risks of  delivery. Risks of  delivery discussed including bleeding, blood transfusion, infection, scar, wound breakdown, pain, need for postop pain medications, inadvertent injury to GI/ structures and possible need for surgical repair at time of surgery or in the future, urinary retention, DVT, anesthesia complications, temporary or permanent nerve damage, organ failure, and even death as well as potential need for  section in future pregnancies and subsequent morbidity therein.   Patient did have ultrasound earlier in the pregnancy that was suggestive of a large for gestational age infant with the abdominal circumference greater than the 90th percentile; however, more recently maternal-fetal medicine ultrasound showed normal fetal growth with an estimated fetal weight at the 75th percentile and an abdominal circumference at the 32nd percentile.  Likely better blood sugar control is contributing to the normal growth range.  Have discussed with the patient the options of expectant management versus a trial of labor.  We discussed ACOG recommendations: For trial of labor at 39 weeks of gestation due to gestational diabetes.  Given the above estimated fetal weight I feel a trial of labor is warranted.    Risk, benefits, alternatives discussed with the patient at length.  She verbalized understanding and wished to proceed with labor induction.  We will plan  cervical ripening balloon.  Please see per separate note.    2.  We discussed pain control options with the patient.  She has had natural delivery in each of her first 3 pregnancies.  She is hoping for unmedicated delivery at this time as well.    VTE Prophylaxis:  Mechanical VTE prophylaxis orders are present.        CODE STATUS:    Code Status (Patient has no pulse and is not breathing): CPR (Attempt to Resuscitate)  Medical Interventions (Patient has pulse or is breathing): Full Support    Admission Status:  I believe this patient meets NP status.    Yossi Torres MD

## 2025-01-24 LAB
ALBUMIN SERPL-MCNC: 2.6 G/DL (ref 3.5–5.2)
ANION GAP SERPL CALCULATED.3IONS-SCNC: 14.6 MMOL/L (ref 5–15)
BUN SERPL-MCNC: 7 MG/DL (ref 6–20)
BUN/CREAT SERPL: 17.1 (ref 7–25)
C-ANCA TITR SER IF: NORMAL TITER
CALCIUM SPEC-SCNC: 8.2 MG/DL (ref 8.6–10.5)
CENTROMERE B AB SER-ACNC: <0.2 AI (ref 0–0.9)
CHLORIDE SERPL-SCNC: 104 MMOL/L (ref 98–107)
CHROMATIN AB SERPL-ACNC: <0.2 AI (ref 0–0.9)
CO2 SERPL-SCNC: 18.4 MMOL/L (ref 22–29)
CREAT SERPL-MCNC: 0.41 MG/DL (ref 0.57–1)
DSDNA AB SER-ACNC: <1 IU/ML (ref 0–9)
EGFRCR SERPLBLD CKD-EPI 2021: 131.8 ML/MIN/1.73
ENA JO1 AB SER-ACNC: <0.2 AI (ref 0–0.9)
ENA RNP AB SER-ACNC: <0.2 AI (ref 0–0.9)
ENA SCL70 AB SER-ACNC: <0.2 AI (ref 0–0.9)
ENA SM AB SER-ACNC: <0.2 AI (ref 0–0.9)
ENA SS-A AB SER-ACNC: <0.2 AI (ref 0–0.9)
ENA SS-B AB SER-ACNC: <0.2 AI (ref 0–0.9)
GBM AB SER IA-ACNC: <0.2 UNITS (ref 0–0.9)
GLUCOSE BLDC GLUCOMTR-MCNC: 72 MG/DL (ref 70–130)
GLUCOSE SERPL-MCNC: 64 MG/DL (ref 65–99)
KAPPA LC FREE SER-MCNC: 26 MG/L (ref 3.3–19.4)
KAPPA LC FREE/LAMBDA FREE SER: 1.11 {RATIO} (ref 0.26–1.65)
LAMBDA LC FREE SERPL-MCNC: 23.5 MG/L (ref 5.7–26.3)
Lab: NORMAL
MAGNESIUM SERPL-MCNC: 1.7 MG/DL (ref 1.6–2.6)
MYELOPEROXIDASE AB SER IA-ACNC: <0.2 UNITS (ref 0–0.9)
P-ANCA ATYPICAL TITR SER IF: NORMAL TITER
P-ANCA TITR SER IF: NORMAL TITER
PHOSPHATE SERPL-MCNC: 3.4 MG/DL (ref 2.5–4.5)
POTASSIUM SERPL-SCNC: 3.9 MMOL/L (ref 3.5–5.2)
PROTEINASE3 AB SER IA-ACNC: <0.2 UNITS (ref 0–0.9)
SODIUM SERPL-SCNC: 137 MMOL/L (ref 136–145)

## 2025-01-24 PROCEDURE — 83735 ASSAY OF MAGNESIUM: CPT

## 2025-01-24 PROCEDURE — 80069 RENAL FUNCTION PANEL: CPT

## 2025-01-24 PROCEDURE — 25010000002 PENICILLIN G POTASSIUM PER 600000 UNITS: Performed by: OBSTETRICS & GYNECOLOGY

## 2025-01-24 PROCEDURE — C1755 CATHETER, INTRASPINAL: HCPCS | Performed by: ANESTHESIOLOGY

## 2025-01-24 PROCEDURE — 83516 IMMUNOASSAY NONANTIBODY: CPT | Performed by: HOSPITALIST

## 2025-01-24 PROCEDURE — 25010000002 LIDOCAINE-EPINEPHRINE (PF) 1 %-1:200000 SOLUTION: Performed by: ANESTHESIOLOGY

## 2025-01-24 PROCEDURE — 82948 REAGENT STRIP/BLOOD GLUCOSE: CPT

## 2025-01-24 PROCEDURE — 25810000003 LACTATED RINGERS PER 1000 ML: Performed by: OBSTETRICS & GYNECOLOGY

## 2025-01-24 PROCEDURE — 82595 ASSAY OF CRYOGLOBULIN: CPT | Performed by: HOSPITALIST

## 2025-01-24 PROCEDURE — 25010000002 LIDOCAINE 1 % SOLUTION: Performed by: ANESTHESIOLOGY

## 2025-01-24 RX ORDER — CARBOPROST TROMETHAMINE 250 UG/ML
250 INJECTION, SOLUTION INTRAMUSCULAR AS NEEDED
Status: DISCONTINUED | OUTPATIENT
Start: 2025-01-24 | End: 2025-01-24 | Stop reason: HOSPADM

## 2025-01-24 RX ORDER — PHYTONADIONE 1 MG/.5ML
INJECTION, EMULSION INTRAMUSCULAR; INTRAVENOUS; SUBCUTANEOUS
Status: ACTIVE
Start: 2025-01-24 | End: 2025-01-25

## 2025-01-24 RX ORDER — OXYTOCIN/0.9 % SODIUM CHLORIDE 30/500 ML
125 PLASTIC BAG, INJECTION (ML) INTRAVENOUS CONTINUOUS PRN
Status: DISCONTINUED | OUTPATIENT
Start: 2025-01-24 | End: 2025-01-26 | Stop reason: HOSPADM

## 2025-01-24 RX ORDER — OXYTOCIN/0.9 % SODIUM CHLORIDE 30/500 ML
999 PLASTIC BAG, INJECTION (ML) INTRAVENOUS ONCE
Status: COMPLETED | OUTPATIENT
Start: 2025-01-24 | End: 2025-01-24

## 2025-01-24 RX ORDER — PROMETHAZINE HYDROCHLORIDE 12.5 MG/1
12.5 TABLET ORAL EVERY 4 HOURS PRN
Status: DISCONTINUED | OUTPATIENT
Start: 2025-01-24 | End: 2025-01-26 | Stop reason: HOSPADM

## 2025-01-24 RX ORDER — MISOPROSTOL 200 UG/1
800 TABLET ORAL AS NEEDED
Status: DISCONTINUED | OUTPATIENT
Start: 2025-01-24 | End: 2025-01-24 | Stop reason: HOSPADM

## 2025-01-24 RX ORDER — ACETAMINOPHEN 325 MG/1
650 TABLET ORAL EVERY 6 HOURS PRN
Status: DISCONTINUED | OUTPATIENT
Start: 2025-01-24 | End: 2025-01-26 | Stop reason: HOSPADM

## 2025-01-24 RX ORDER — OXYTOCIN/0.9 % SODIUM CHLORIDE 30/500 ML
125 PLASTIC BAG, INJECTION (ML) INTRAVENOUS ONCE AS NEEDED
Status: COMPLETED | OUTPATIENT
Start: 2025-01-24 | End: 2025-01-24

## 2025-01-24 RX ORDER — TRAMADOL HYDROCHLORIDE 50 MG/1
50 TABLET ORAL EVERY 6 HOURS PRN
Status: DISCONTINUED | OUTPATIENT
Start: 2025-01-24 | End: 2025-01-26 | Stop reason: HOSPADM

## 2025-01-24 RX ORDER — OXYTOCIN/0.9 % SODIUM CHLORIDE 30/500 ML
250 PLASTIC BAG, INJECTION (ML) INTRAVENOUS CONTINUOUS
Status: DISPENSED | OUTPATIENT
Start: 2025-01-24 | End: 2025-01-24

## 2025-01-24 RX ORDER — IBUPROFEN 600 MG/1
600 TABLET, FILM COATED ORAL EVERY 6 HOURS PRN
Status: DISCONTINUED | OUTPATIENT
Start: 2025-01-24 | End: 2025-01-26 | Stop reason: HOSPADM

## 2025-01-24 RX ORDER — HYDROXYZINE HYDROCHLORIDE 50 MG/1
50 TABLET, FILM COATED ORAL NIGHTLY PRN
Status: DISCONTINUED | OUTPATIENT
Start: 2025-01-24 | End: 2025-01-26 | Stop reason: HOSPADM

## 2025-01-24 RX ORDER — SODIUM CHLORIDE 0.9 % (FLUSH) 0.9 %
1-10 SYRINGE (ML) INJECTION AS NEEDED
Status: DISCONTINUED | OUTPATIENT
Start: 2025-01-24 | End: 2025-01-26 | Stop reason: HOSPADM

## 2025-01-24 RX ORDER — DOCUSATE SODIUM 100 MG/1
100 CAPSULE, LIQUID FILLED ORAL 2 TIMES DAILY
Status: DISCONTINUED | OUTPATIENT
Start: 2025-01-24 | End: 2025-01-26 | Stop reason: HOSPADM

## 2025-01-24 RX ORDER — TRANEXAMIC ACID 10 MG/ML
1000 INJECTION, SOLUTION INTRAVENOUS ONCE AS NEEDED
Status: DISCONTINUED | OUTPATIENT
Start: 2025-01-24 | End: 2025-01-26 | Stop reason: HOSPADM

## 2025-01-24 RX ORDER — ONDANSETRON 2 MG/ML
4 INJECTION INTRAMUSCULAR; INTRAVENOUS EVERY 6 HOURS PRN
Status: DISCONTINUED | OUTPATIENT
Start: 2025-01-24 | End: 2025-01-24 | Stop reason: HOSPADM

## 2025-01-24 RX ORDER — LIDOCAINE HYDROCHLORIDE 10 MG/ML
INJECTION, SOLUTION INFILTRATION; PERINEURAL AS NEEDED
Status: DISCONTINUED | OUTPATIENT
Start: 2025-01-24 | End: 2025-01-24 | Stop reason: SURG

## 2025-01-24 RX ORDER — METHYLERGONOVINE MALEATE 0.2 MG/ML
200 INJECTION INTRAVENOUS ONCE AS NEEDED
Status: DISCONTINUED | OUTPATIENT
Start: 2025-01-24 | End: 2025-01-24 | Stop reason: HOSPADM

## 2025-01-24 RX ORDER — MISOPROSTOL 200 UG/1
800 TABLET ORAL AS NEEDED
Status: DISCONTINUED | OUTPATIENT
Start: 2025-01-24 | End: 2025-01-26 | Stop reason: HOSPADM

## 2025-01-24 RX ORDER — ONDANSETRON 4 MG/1
4 TABLET, ORALLY DISINTEGRATING ORAL EVERY 6 HOURS PRN
Status: DISCONTINUED | OUTPATIENT
Start: 2025-01-24 | End: 2025-01-24 | Stop reason: HOSPADM

## 2025-01-24 RX ORDER — ACETAMINOPHEN 325 MG/1
650 TABLET ORAL EVERY 4 HOURS PRN
Status: DISCONTINUED | OUTPATIENT
Start: 2025-01-24 | End: 2025-01-24 | Stop reason: HOSPADM

## 2025-01-24 RX ORDER — ERYTHROMYCIN 5 MG/G
OINTMENT OPHTHALMIC
Status: ACTIVE
Start: 2025-01-24 | End: 2025-01-25

## 2025-01-24 RX ORDER — BISACODYL 10 MG
10 SUPPOSITORY, RECTAL RECTAL DAILY PRN
Status: DISCONTINUED | OUTPATIENT
Start: 2025-01-25 | End: 2025-01-26 | Stop reason: HOSPADM

## 2025-01-24 RX ORDER — HYDROCORTISONE 25 MG/G
1 CREAM TOPICAL AS NEEDED
Status: DISCONTINUED | OUTPATIENT
Start: 2025-01-24 | End: 2025-01-26 | Stop reason: HOSPADM

## 2025-01-24 RX ORDER — PRENATAL VIT/IRON FUM/FOLIC AC 27MG-0.8MG
1 TABLET ORAL DAILY
Status: DISCONTINUED | OUTPATIENT
Start: 2025-01-24 | End: 2025-01-26 | Stop reason: HOSPADM

## 2025-01-24 RX ADMIN — Medication 999 ML/HR: at 15:44

## 2025-01-24 RX ADMIN — Medication 125 ML/HR: at 17:03

## 2025-01-24 RX ADMIN — Medication: at 22:26

## 2025-01-24 RX ADMIN — IBUPROFEN 600 MG: 600 TABLET ORAL at 20:03

## 2025-01-24 RX ADMIN — Medication 250 ML/HR: at 15:59

## 2025-01-24 RX ADMIN — SODIUM CHLORIDE, POTASSIUM CHLORIDE, SODIUM LACTATE AND CALCIUM CHLORIDE 125 ML/HR: 600; 310; 30; 20 INJECTION, SOLUTION INTRAVENOUS at 11:30

## 2025-01-24 RX ADMIN — LIDOCAINE HYDROCHLORIDE 5 ML: 10 INJECTION, SOLUTION INFILTRATION; PERINEURAL at 11:00

## 2025-01-24 RX ADMIN — SODIUM CHLORIDE, POTASSIUM CHLORIDE, SODIUM LACTATE AND CALCIUM CHLORIDE 50 ML/HR: 600; 310; 30; 20 INJECTION, SOLUTION INTRAVENOUS at 08:09

## 2025-01-24 RX ADMIN — EPHEDRINE SULFATE 5 MG: 50 INJECTION INTRAVENOUS at 11:32

## 2025-01-24 RX ADMIN — Medication 10 ML/HR: at 11:05

## 2025-01-24 RX ADMIN — PENICILLIN G 3 MILLION UNITS: 3000000 INJECTION, SOLUTION INTRAVENOUS at 08:10

## 2025-01-24 RX ADMIN — DOCUSATE SODIUM 100 MG: 100 CAPSULE, LIQUID FILLED ORAL at 20:03

## 2025-01-24 RX ADMIN — LIDOCAINE HYDROCHLORIDE 4 ML: 10; .005 INJECTION, SOLUTION EPIDURAL; INFILTRATION; INTRACAUDAL; PERINEURAL at 10:57

## 2025-01-24 RX ADMIN — PRENATAL VITAMINS-IRON FUMARATE 27 MG IRON-FOLIC ACID 0.8 MG TABLET 1 TABLET: at 20:03

## 2025-01-24 RX ADMIN — PENICILLIN G 3 MILLION UNITS: 3000000 INJECTION, SOLUTION INTRAVENOUS at 04:00

## 2025-01-24 RX ADMIN — PENICILLIN G 3 MILLION UNITS: 3000000 INJECTION, SOLUTION INTRAVENOUS at 13:06

## 2025-01-24 RX ADMIN — PENICILLIN G 3 MILLION UNITS: 3000000 INJECTION, SOLUTION INTRAVENOUS at 00:01

## 2025-01-24 NOTE — L&D DELIVERY NOTE
[facility]  Vaginal Delivery Note    Delivery    Predelivery Diagnoses: 1) Pregnancy at 39w4d                                          2) Gest DM, insulin controlled                                           3) AMA                                          4) nephrotic range proteinuria                     Postdelivery Diagnoses 1) Pregnancy at 39w4d                                           2) Gest DM, insulin controlled                                           3) AMA                                          4) nephrotic range proteinuria     Attending :  Chino Back MD     Procedure : Obstetrical controlled vaginal delivery    Delivery Narrative :     Anila Sepulveda is a 34 y.o. year old  @ 39w4d estimated gestational age. She presented to L&D for scheduled medical induction at 39 weeks with GDM, insulin requiring.  Her prenatal care was with Dr. Torres and was complicated by 1) AMA - negative NIPT, 2) GDM - insulin requiring- good control, 3) GBS carrier on PCN and 4) nephrotic level proteinuria- nephrology has seen and initiated work up with plants for follow up. She was given cervical balloon and that eventually came out, followed by pitocin, amniotomy with progression to 4 cm this morning.  When she stalled at this point she did consent to epidural and a few hours later, I was notified she was complete and ready for delivery.      Upon my arrival she was prepped in the lithotomy position, we began pushing together and it took her only two contractions to deliver.  With delivery of the fetal head in OA presentation, bulb suction was performed, then using a jaquez type maneuver, pressure was placed along the posterior aspect of the anterior shoulder and it delivered without difficulty. No nuchal cord noted.  The infant showed good cry and tone and was placed upon the Mother's abdomen.   After a thirty second delay, I then clamped the cord and it was cut by a friend with her.  Care of the infant was  then turned over to the delivery team.  Cord blood was obtained and then using gentle pressure the placenta was delivered spontaneous/intact and noted to have 3 vessel cord.  At that point I undertook inspection of the perineum and vulva and no laceration noted.       The area was noted to be hemostatic and all sponge and needle counts were correct. A vaginal exam showed no issues with retained equipment in an abnormal place.      There was one friend(s) noted to be in the room with this patient.            Delivery: Vaginal, Spontaneous    YOB: 2026   Time of Birth: 3:41 PM     Anesthesia: Spinal            QBL: per RN          Infant    Findings: female infant     Infant observations: Weight: No birth weight on file.  Length:   in  Observations/Comments:  Shankar Rm 4     Apgars: 8  @ 1 minute /    9  @ 5 minutes       Complications  none    Disposition  Mother to Mother Baby/Postpartum  in stable condition currently.  Baby to remains with mom  in stable condition currently.      Chino Back MD  01/24/25  16:21 EST

## 2025-01-24 NOTE — ANESTHESIA PROCEDURE NOTES
Labor Epidural      Patient reassessed immediately prior to procedure    Patient location during procedure: OB  Performed By  Anesthesiologist: Miquel Foley MD  Preanesthetic Checklist  Completed: patient identified, IV checked, site marked, risks and benefits discussed, surgical consent, monitors and equipment checked, pre-op evaluation and timeout performed  Additional Notes  Test dose 4 cc 1% lidocaine with epi.  Second dose 5cc 1% lidocaine then continuous infusion o.2% Ropivicaine with 2 Mics fentanyl per cc at 10 cc hr, with  6cc PCA, 15 min lockout  Prep:  Pt Position:sitting  Sterile Tech:gloves, cap, mask and sterile barrier  Prep:chlorhexidine gluconate and isopropyl alcohol  Monitoring:blood pressure monitoring, continuous pulse oximetry and EKG  Epidural Block Procedure:  Approach:midline  Guidance:landmark technique  Location:L4-L5  Needle Type:Tuohy  Needle Gauge:17 G  Loss of Resistance Medium: air  Paresthesia: none  Aspiration:negative  Test Dose:negative  Number of Attempts: 1  Post Assessment:  Dressing:occlusive dressing applied and secured with tape  Pt Tolerance:patient tolerated the procedure well with no apparent complications  Complications:no

## 2025-01-24 NOTE — ANESTHESIA PREPROCEDURE EVALUATION
Anesthesia Evaluation     Patient summary reviewed and Nursing notes reviewed   no history of anesthetic complications:   NPO Solid Status: > 8 hours  NPO Liquid Status: > 2 hours           Airway   Mallampati: II  TM distance: >3 FB  Neck ROM: full  Dental      Pulmonary    Cardiovascular         Neuro/Psych  GI/Hepatic/Renal/Endo      Musculoskeletal     Abdominal    Substance History      OB/GYN    (+) Pregnant    Comment: 39 WK 4 DAYS      Other                        Anesthesia Plan    ASA 2     epidural       Anesthetic plan, risks, benefits, and alternatives have been provided, discussed and informed consent has been obtained with: patient.      CODE STATUS:    Code Status (Patient has no pulse and is not breathing): CPR (Attempt to Resuscitate)  Medical Interventions (Patient has pulse or is breathing): Full Support

## 2025-01-24 NOTE — ANESTHESIA POSTPROCEDURE EVALUATION
Patient: Anila Sepulveda    Procedure Summary       Date: 01/24/25 Room / Location:     Anesthesia Start: 1051 Anesthesia Stop: 1541    Procedure: LABOR ANALGESIA Diagnosis:     Scheduled Providers:  Provider: Miquel Foley MD    Anesthesia Type: epidural ASA Status: 2            Anesthesia Type: epidural    Vitals  Vitals Value Taken Time   BP 90/36 01/24/25 1631   Temp 36.8 °C (98.2 °F) 01/24/25 1545   Pulse 83 01/24/25 1635   Resp 14 01/24/25 1630   SpO2 100 % 01/24/25 1635   Vitals shown include unfiled device data.        Post Anesthesia Care and Evaluation    Patient location during evaluation: bedside  Patient participation: complete - patient participated  Level of consciousness: awake and alert  Pain management: adequate    Airway patency: patent  Anesthetic complications: No anesthetic complications  PONV Status: controlled  Cardiovascular status: acceptable  Respiratory status: acceptable  Hydration status: acceptable

## 2025-01-24 NOTE — PROGRESS NOTES
OB Note     34 y.o.  @ 39w4d Admitted for medical induction secondary to GDM, insulin requiring. She was admitted and started cervical ripening balloon yesterday afternoon. That has proceeded to AROM of dilated cervix and now is entering a good overall pattern of contractions.  Prenatal care with Dr. Torres, complicated by 1) AMA - negative NIPT, 2) GDM - insulin requiring, 3) GBS carrier on PCN and 4) nephrotic level proteinuria.      Temp:  [97.9 °F (36.6 °C)-98.5 °F (36.9 °C)] 98.3 °F (36.8 °C)  Heart Rate:  [64-93] 91  Resp:  [16-18] 16  BP: (101-142)/(56-94) 101/64    Gen- WN, WD female in NAD  Cx per last exam /-2, 90 minutes ago  Ext Non-tender, SCD on   EFW per most recent ultrasound 75%ile, A/C 32%ile, 8.25#     FHT - reassuring, category I   [place] with IUPC - q 3 minutes adequate     Lab Results   Component Value Date    WBC 8.21 2025    HGB 11.0 (L) 2025    HCT 32.5 (L) 2025    MCV 83.1 2025     2025     Lab Results   Component Value Date    GLUCOSE 64 (L) 2025    BUN 7 2025    CREATININE 0.41 (L) 2025     2025    K 3.9 2025     2025    CALCIUM 8.2 (L) 2025    PROTEINTOT 6.1 2025    ALBUMIN 2.6 (L) 2025    ALT 9 2025    AST 18 2025    ALKPHOS 161 (H) 2025    BILITOT <0.2 2025    GLOB 3.4 2025    AGRATIO 0.8 2025    BCR 17.1 2025    ANIONGAP 14.6 2025    EGFR 131.8 2025     1) Pregnancy at term   Induction of labor - transitioned from cervical balloon to pitocin and in late latent phase.  Anticipate progress to active phase soon. Comfortable with nitrous - currently desires not to use regional anesthesia   2) GDM - most recent BS 72   3) GBS carrier, on PCN for prophylaxis   4) Nephrotic syndrome protein in urine - nephology evaluating at this time.   5) Fetal status reassuring     Chino Back MD  2025  08:46 EST

## 2025-01-24 NOTE — PLAN OF CARE
Problem: Adult Inpatient Plan of Care  Goal: Plan of Care Review  Outcome: Progressing  Flowsheets (Taken 1/24/2025 1730)  Outcome Evaluation: Continues with IOL, epidural for pain control.  Spontaneous vaginal delivery.  Vs and bleeding wnl during recovery.  Anticipte transfer to mother baby unit with no complications  Plan of Care Reviewed With: patient  Goal: Patient-Specific Goal (Individualized)  Outcome: Progressing  Flowsheets (Taken 1/24/2025 1730)  Patient/Family-Specific Goals (Include Timeframe): healthy mom and baby by discharge.  Individualized Care Needs: non english  Anxieties, Fears or Concerns: scared of needles  Goal: Absence of Hospital-Acquired Illness or Injury  Outcome: Progressing  Intervention: Identify and Manage Fall Risk  Recent Flowsheet Documentation  Taken 1/24/2025 1545 by Samina Gonzales, RN  Safety Promotion/Fall Prevention:   activity supervised   clutter free environment maintained   fall prevention program maintained   nonskid shoes/slippers when out of bed   safety round/check completed  Taken 1/24/2025 1130 by Samina Gonzales, RN  Safety Promotion/Fall Prevention:   activity supervised   clutter free environment maintained   fall prevention program maintained   nonskid shoes/slippers when out of bed   safety round/check completed  Taken 1/24/2025 0735 by Samina Gonzales, RN  Safety Promotion/Fall Prevention:   activity supervised   clutter free environment maintained   fall prevention program maintained   nonskid shoes/slippers when out of bed   safety round/check completed  Intervention: Prevent Infection  Recent Flowsheet Documentation  Taken 1/24/2025 1715 by Samina Gonzales, RN  Infection Prevention:   cohorting utilized   environmental surveillance performed   equipment surfaces disinfected   hand hygiene promoted   personal protective equipment utilized   rest/sleep promoted   single patient room provided   visitors restricted/screened  Taken 1/24/2025 1700 by  Samina Gonzales RN  Infection Prevention:   cohorting utilized   environmental surveillance performed   equipment surfaces disinfected   hand hygiene promoted   personal protective equipment utilized   rest/sleep promoted   single patient room provided   visitors restricted/screened  Taken 1/24/2025 1645 by Samina Gonzales RN  Infection Prevention:   cohorting utilized   environmental surveillance performed   equipment surfaces disinfected   hand hygiene promoted   personal protective equipment utilized   rest/sleep promoted   single patient room provided   visitors restricted/screened  Taken 1/24/2025 1630 by Samina Gonzales RN  Infection Prevention:   cohorting utilized   environmental surveillance performed   equipment surfaces disinfected   hand hygiene promoted   personal protective equipment utilized   rest/sleep promoted   single patient room provided   visitors restricted/screened  Taken 1/24/2025 1615 by Samina Gonzales RN  Infection Prevention:   cohorting utilized   environmental surveillance performed   equipment surfaces disinfected   hand hygiene promoted   personal protective equipment utilized   rest/sleep promoted   single patient room provided   other (see comments)  Taken 1/24/2025 1600 by Samina Gonzales RN  Infection Prevention:   cohorting utilized   environmental surveillance performed   equipment surfaces disinfected   hand hygiene promoted   personal protective equipment utilized   rest/sleep promoted   single patient room provided   visitors restricted/screened  Taken 1/24/2025 1545 by Samina Gonzales RN  Infection Prevention:   cohorting utilized   environmental surveillance performed   equipment surfaces disinfected   hand hygiene promoted   personal protective equipment utilized   rest/sleep promoted   single patient room provided   visitors restricted/screened  Taken 1/24/2025 1130 by Samina Gonzales RN  Infection Prevention:   cohorting utilized   environmental  surveillance performed   equipment surfaces disinfected   hand hygiene promoted   personal protective equipment utilized   rest/sleep promoted   single patient room provided   visitors restricted/screened  Taken 1/24/2025 0735 by Samina Gonzales RN  Infection Prevention:   cohorting utilized   environmental surveillance performed   equipment surfaces disinfected   hand hygiene promoted   personal protective equipment utilized   rest/sleep promoted   single patient room provided   visitors restricted/screened  Goal: Optimal Comfort and Wellbeing  Outcome: Progressing  Intervention: Monitor Pain and Promote Comfort  Recent Flowsheet Documentation  Taken 1/24/2025 0735 by Samina Gonzales RN  Pain Management Interventions: pain management plan reviewed with patient/caregiver  Taken 1/24/2025 0731 by Samina Gonzales RN  Pain Management Interventions: (nitrous gas applied) --  Intervention: Provide Person-Centered Care  Recent Flowsheet Documentation  Taken 1/24/2025 1545 by Samina Gonzales RN  Trust Relationship/Rapport:   care explained   choices provided   emotional support provided   empathic listening provided   questions answered   questions encouraged   reassurance provided   thoughts/feelings acknowledged  Taken 1/24/2025 0735 by Samina Gonzales RN  Trust Relationship/Rapport:   care explained   choices provided   emotional support provided   empathic listening provided   questions answered   questions encouraged   reassurance provided   thoughts/feelings acknowledged  Goal: Readiness for Transition of Care  Outcome: Progressing     Problem: Labor  Goal: Hemostasis  Outcome: Progressing  Goal: Stable Fetal Wellbeing  Outcome: Progressing  Goal: Effective Progression to Delivery  Outcome: Progressing  Goal: Absence of Infection Signs and Symptoms  Outcome: Progressing  Intervention: Prevent or Manage Infection  Recent Flowsheet Documentation  Taken 1/24/2025 1715 by Samina Gonzales RN  Infection  Prevention:   cohorting utilized   environmental surveillance performed   equipment surfaces disinfected   hand hygiene promoted   personal protective equipment utilized   rest/sleep promoted   single patient room provided   visitors restricted/screened  Taken 1/24/2025 1700 by Samina Gonzales RN  Infection Prevention:   cohorting utilized   environmental surveillance performed   equipment surfaces disinfected   hand hygiene promoted   personal protective equipment utilized   rest/sleep promoted   single patient room provided   visitors restricted/screened  Taken 1/24/2025 1645 by Samina Gonzales RN  Infection Prevention:   cohorting utilized   environmental surveillance performed   equipment surfaces disinfected   hand hygiene promoted   personal protective equipment utilized   rest/sleep promoted   single patient room provided   visitors restricted/screened  Taken 1/24/2025 1630 by Samina Gonzales RN  Infection Prevention:   cohorting utilized   environmental surveillance performed   equipment surfaces disinfected   hand hygiene promoted   personal protective equipment utilized   rest/sleep promoted   single patient room provided   visitors restricted/screened  Taken 1/24/2025 1615 by Samina Gonzales RN  Infection Prevention:   cohorting utilized   environmental surveillance performed   equipment surfaces disinfected   hand hygiene promoted   personal protective equipment utilized   rest/sleep promoted   single patient room provided   other (see comments)  Taken 1/24/2025 1600 by Samina Gonzales RN  Infection Prevention:   cohorting utilized   environmental surveillance performed   equipment surfaces disinfected   hand hygiene promoted   personal protective equipment utilized   rest/sleep promoted   single patient room provided   visitors restricted/screened  Taken 1/24/2025 1545 by Samina Gonzales RN  Infection Prevention:   cohorting utilized   environmental surveillance performed   equipment  surfaces disinfected   hand hygiene promoted   personal protective equipment utilized   rest/sleep promoted   single patient room provided   visitors restricted/screened  Taken 1/24/2025 1130 by Samina Gonzales RN  Infection Prevention:   cohorting utilized   environmental surveillance performed   equipment surfaces disinfected   hand hygiene promoted   personal protective equipment utilized   rest/sleep promoted   single patient room provided   visitors restricted/screened  Taken 1/24/2025 0735 by Samina Gonzales, RN  Infection Prevention:   cohorting utilized   environmental surveillance performed   equipment surfaces disinfected   hand hygiene promoted   personal protective equipment utilized   rest/sleep promoted   single patient room provided   visitors restricted/screened  Goal: Acceptable Pain Control  Outcome: Progressing  Goal: Normal Uterine Contraction Pattern  Outcome: Progressing     Problem: Skin Injury Risk Increased  Goal: Skin Health and Integrity  Outcome: Progressing  Intervention: Optimize Skin Protection  Recent Flowsheet Documentation  Taken 1/24/2025 1600 by Samina Gonzales RN  Activity Management: bedrest  Taken 1/24/2025 1545 by Samina Gonzales RN  Activity Management: bedrest   Goal Outcome Evaluation:  Plan of Care Reviewed With: patient           Outcome Evaluation: Continues with IOL, epidural for pain control.  Spontaneous vaginal delivery.  Vs and bleeding wnl during recovery.  Anticipte transfer to mother baby unit with no complications

## 2025-01-24 NOTE — PROGRESS NOTES
[facility] Progress Note      Patient Name: Anila Sepulveda  : 1990  MRN: [id number]  Primary Care Physician:  Frannie Vanegas, JAN  Date of admission: 2025    Subjective     Interval History:   Follow up on nephrotic proteinuria       Doing well, received epidural today, has not delivered yet  No c/o chest pain, dyspnea, or N/V/D    Review of Systems:   As noted above    Objective     Vitals:   Temp:  [97.9 °F (36.6 °C)-98.5 °F (36.9 °C)] 98.3 °F (36.8 °C)  Heart Rate:  [] 82  Resp:  [16-18] 16  BP: (101-146)/(56-98) 115/62    Intake/Output Summary (Last 24 hours) at 2025 1218  Last data filed at 2025 0128  Gross per 24 hour   Intake --   Output 3700 ml   Net -3700 ml       Physical Exam:    General Appearance: alert, oriented x 3, no acute distress   Skin: warm and dry  HEENT: oral mucosa normal, nonicteric sclera  Neck: supple, no JVD  Lungs: CTA, unlabored on RA  Heart: RRR, no rub  Abdomen: soft, nontender, nondistended, fetal monitor on  : Gutierrez is anchored  Extremities: 1+ BLE edema, cyanosis or clubbing  Neuro: normal speech and mental status     Scheduled Meds:     lactated ringers, 1,000 mL, Intravenous, Once  mineral oil, 30 mL, Topical, Once  penicillin g (potassium), 3 Million Units, Intravenous, Q4H  sodium chloride, 10 mL, Intravenous, Q12H      IV Meds:   fentaNYL-ropivacaine-NaCl, 10 mL/hr, Last Rate: 10 mL/hr (25 1105)  lactated ringers, 50 mL/hr, Last Rate: 125 mL/hr (25 1147)  oxytocin, 1-20 kristina-units/min, Last Rate: 18 kristina-units/min (25 0555)        Results Reviewed:   I have personally reviewed the results from the time of this admission to 2025 12:18 EST     Results from last 7 days   Lab Units 25  0415 25  1200   SODIUM mmol/L 137 133*   POTASSIUM mmol/L 3.9 4.2   CHLORIDE mmol/L 104 102   CO2 mmol/L 18.4* 18.7*   BUN mg/dL 7 10   CREATININE mg/dL 0.41* 0.42*   CALCIUM mg/dL 8.2*  8.5*   BILIRUBIN mg/dL  --  <0.2   ALK PHOS U/L  --  161*   ALT (SGPT) U/L  --  9   AST (SGOT) U/L  --  18   GLUCOSE mg/dL 64* 81       Estimated Creatinine Clearance: 203.2 mL/min (A) (by C-G formula based on SCr of 0.41 mg/dL (L)).    Results from last 7 days   Lab Units 01/24/25  0415   MAGNESIUM mg/dL 1.7   PHOSPHORUS mg/dL 3.4             Results from last 7 days   Lab Units 01/23/25  1200   WBC 10*3/mm3 8.21   HEMOGLOBIN g/dL 11.0*   PLATELETS 10*3/mm3 285             Assessment / Plan     ASSESSMENT:  Nephrotic range proteinuria, progressively worse since December '24 (UPCR 2.5g/g->9g/g); no evidence of preeclampsia given normal blood pressure.  renal function stable at baseline, low serum albumin, 2.7. Diagnosed with gestational diabetes 7 months ago, A1c 5.4 on 12/18/2024,.  Needs to rule out  GN (hepatitis B&C negative July '24; HIV negative November '24), serologies pending. UA positive for hematuria (11-20 RBC). May require kidney biopsy after delivery  Gestational diabetes, BG well-controlled  Intrapartum multigravida of advanced maternal age, being induced   GBS positive, s/p penicillin  Anemia    PLAN:  Await serologies  Will monitor proteinuria postpartum, depending on progress, consider starting ACEi/ARB/ possible kidney biopsy   Surveillance labs  Patient has an appoint set up with our NP Marcie Dorado on 2/11/25 at 12:45  Thank you for involving us in the care of Anila Sepulveda.  Please feel free to call with any questions.    Forest Harp MD  01/24/25  12:18 [facility]    [facility]  [phone]    Please note that portions of this note were completed with a voice recognition program.

## 2025-01-24 NOTE — PROGRESS NOTES
"Patient's pulmonary had been expelled, but cervix was only 2 cm after expulsion.    Subjective: Currently feeling well.  Just reporting mild contractions.  Denies vaginal bleeding or leakage of fluid.    O:  Vitals:    25 1147 25 1204 25 1655 25 1930   BP: 127/88  121/73    Pulse: 89  88    Resp:  17 17    Temp:  98 °F (36.7 °C) 98 °F (36.7 °C) 98.3 °F (36.8 °C)   TempSrc:  Oral Oral Oral   SpO2:  100%     Weight:  82.6 kg (182 lb)     Height:  165.1 cm (65\")       Pelvic exam performed in the presence of a female RN chaperone.  Patient's friend was also present for pelvic exam.  Patient provided verbal consent to proceed with pelvic exam.        Cervix: 2-3 cm dilated/70% effaced/-2.  Artificial rupture membranes performed with clear fluid noted.    NST: 120/reactive/moderate variability/no decelerations  Tocometry: Irregular contraction pattern, at times contractions are every 2, but other times we will space out to Q 5-6.    Assessment:  1.  34-year-old  8 para 3-0-4-3 at 39-3/7 weeks gestational age  2.  Gestational diabetes, controlled on oral agents  3.  Nephrotic range proteinuria, nephrology following  4.  GBS positive  5.  Fetal heart tones category 1    Plan:  1.  We will continue with labor induction.  Continue Pitocin.  Will attempt to titrate to achieve a goal of painful contractions every 2-3 minutes.  Patient not currently desiring epidural, but if she does want 1 she may have it at request.  Patient would also be a candidate for nitrous oxide if she wishes.  2.  Plan of care discussed with the patient at length.  She verbalized understanding.  "

## 2025-01-24 NOTE — PLAN OF CARE
Problem: Adult Inpatient Plan of Care  Goal: Plan of Care Review  Outcome: Progressing  Flowsheets (Taken 1/24/2025 0722)  Progress: improving  Outcome Evaluation: Pt IOL. Pt AROMed and continued to go up on pitocin.  Plan of Care Reviewed With: patient  Goal: Patient-Specific Goal (Individualized)  Outcome: Progressing  Goal: Absence of Hospital-Acquired Illness or Injury  Outcome: Progressing  Goal: Optimal Comfort and Wellbeing  Outcome: Progressing  Intervention: Monitor Pain and Promote Comfort  Recent Flowsheet Documentation  Taken 1/23/2025 1900 by Cyndi Mistry RN  Pain Management Interventions: no interventions per patient request  Goal: Readiness for Transition of Care  Outcome: Progressing     Problem: Labor  Goal: Hemostasis  Outcome: Progressing  Goal: Stable Fetal Wellbeing  Outcome: Progressing  Goal: Effective Progression to Delivery  Outcome: Progressing  Goal: Absence of Infection Signs and Symptoms  Outcome: Progressing  Goal: Acceptable Pain Control  Outcome: Progressing  Goal: Normal Uterine Contraction Pattern  Outcome: Progressing   Goal Outcome Evaluation:  Plan of Care Reviewed With: patient        Progress: improving  Outcome Evaluation: Pt IOL. Pt AROMed and continued to go up on pitocin.

## 2025-01-25 LAB
GLUCOSE BLDC GLUCOMTR-MCNC: 100 MG/DL (ref 70–130)
GLUCOSE BLDC GLUCOMTR-MCNC: 101 MG/DL (ref 70–130)
GLUCOSE BLDC GLUCOMTR-MCNC: 115 MG/DL (ref 70–130)
GLUCOSE BLDC GLUCOMTR-MCNC: 139 MG/DL (ref 70–130)
GLUCOSE BLDC GLUCOMTR-MCNC: 86 MG/DL (ref 70–130)

## 2025-01-25 PROCEDURE — 82948 REAGENT STRIP/BLOOD GLUCOSE: CPT

## 2025-01-25 RX ADMIN — ACETAMINOPHEN 650 MG: 325 TABLET, FILM COATED ORAL at 08:52

## 2025-01-25 RX ADMIN — DOCUSATE SODIUM 100 MG: 100 CAPSULE, LIQUID FILLED ORAL at 08:51

## 2025-01-25 RX ADMIN — ACETAMINOPHEN 650 MG: 325 TABLET, FILM COATED ORAL at 20:11

## 2025-01-25 RX ADMIN — PRENATAL VITAMINS-IRON FUMARATE 27 MG IRON-FOLIC ACID 0.8 MG TABLET 1 TABLET: at 08:52

## 2025-01-25 RX ADMIN — IBUPROFEN 600 MG: 600 TABLET ORAL at 16:50

## 2025-01-25 RX ADMIN — DOCUSATE SODIUM 100 MG: 100 CAPSULE, LIQUID FILLED ORAL at 20:12

## 2025-01-25 NOTE — PROGRESS NOTES
Nephrology:  follow-up nephrotic syndrome    Chart review only  Blood pressure stable post-partum; serum creatinine 0.4 mg/dL  Complements fine; negative TARIK and negative ANCA  F/u arranged in our office with JAN Healy, on 2/11/2025 at 12:45 PM  Will check another urine protein/creatinine ratio in the morning  Discharge home anytime from [facility]    --Tyler Keating MD

## 2025-01-25 NOTE — PROGRESS NOTES
Yuval Sepulveda  : 1990  MRN: [id number]  CSN: [id number]    Hospital Day: 3    Postpartum Day #1  Subjective     CC: hospital follow-up    Her pain is well controlled. Vaginal bleeding is normal in amount. She is ambulating without difficulty. She is passing gas. She is voiding without difficulty. Her baby is doing well..    She reports bleeding as decreased.  She reports pain control as adequate.  She is voiding with no difficulty. She is breast feeding.    Objective     Min/max vitals past 24 hours:   Temp  Min: 98 °F (36.7 °C)  Max: 98.5 °F (36.9 °C)  BP  Min: 92/71  Max: 135/82  Pulse  Min: 65  Max: 106  Resp  Min: 14  Max: 16        General:  Psych:                 comfortable and well appearing  oriented to time, place and person, mood and affect are within normal limits   Abdomen: soft, non-tender; no masses  no umbilical or inguinal hernias are present  no hepato-splenomegaly  fundus firm and non-tender   Pelvic: Not performed   Ext: Calves NT, negative by clinical screen.     Lab Results   Component Value Date    WBC 8.21 2025    HGB 11.0 (L) 2025    HCT 32.5 (L) 2025    MCV 83.1 2025     2025    RH Positive 2025    HEPBSAG Negative 2024        Assessment   Postpartum Day #1 S/P vaginal delivery, doing very well postpartum day 1  Gestational diabetes with normal glucose range this morning.  History of nephrotic range proteinuria  Female infant     Plan   Continue routine postpartum care  Likely discharge home in the morning.  CBC not done today we will go ahead and get 1 tomorrow morning prior to discharge.    Marshall Leonard MD  2025  12:11 EST

## 2025-01-25 NOTE — LACTATION NOTE
This note was copied from a baby's chart.  P4, T baby-16h. old. Mom BF all her other children for about 1 year each. RN asked LC to help mom with BF. Upon entering the room she is trying to BF baby on side lying position. Baby is latching , but the latch is shallow. Ask mom to sit, so we can latch baby deeply, but she doesn't want to do it, said her back is hurting. Mom said she is concern if baby is getting any EBM. Hand expression demonstrated, but no drops of colostrum seen and mom's areolas are very dense. That's one of the reason baby is not able to latch deeply. Mom asked if she need to supplement and it was explained to her, that donor milk and formula are available if she decides to do that. Mother needs PBP, so order will be faxed. PT denies any other questions.

## 2025-01-25 NOTE — PLAN OF CARE
Goal Outcome Evaluation:  Plan of Care Reviewed With: patient        Progress: improving     Vss. Pain controlled w/prn pain meds. Mom agreeing to bottle feeding baby. SW met w/mom as well as LC - supplies given to mom for d/c. Mom wishes to use [facility] on [place]. No other changes at this time.

## 2025-01-25 NOTE — PROGRESS NOTES
"Discharge Planning Assessment  [facility]     Patient Name: Anila Sepulveda  MRN: [id number]  Today's Date: 1/25/2025    Admit Date: 1/23/2025    Plan: Infant may discharge to mother when medically ready. ZACHERY Mcgraw.   Discharge Needs Assessment    No documentation.                  Discharge Plan       Row Name 01/25/25 1127       Plan    Plan Infant may discharge to mother when medically ready. ZACHERY Mcgraw.    Plan Comments (continue) CSW provided mother with a car seat, pack n play, and clothes. Mother and support person thanked CSW. Mother has three other children: 19yr old, 16yr old, & 15yr old. FOB is in [place]. Mother reports, sponsor, children, friends, and other family members are available for support as needed. Mother reports infant is following up with [facility] after discharge; mother is comfortable scheduling appointments for infant and has reliable transportation. Mother is not current with [facility]. The hospital WIC rep is not available today, but CSW had mother complete the WIC referral form and left it for the [place] rep to process when she returns. CSW provided mother with a packet of resources including: WIC, HANDS, transportation, infant supplies, counseling, online support groups, postpartum mood and anxiety resources, and general community resources. CSW spent time building rapport with mother, and offered validation, support, and encouragement to mother throughout assessment. Mother was polite and appropriate, and denied having unmet needs or concerns at this time. CSW will remain available for psychosocial needs during hospitalization. ZACHERY Mcgraw.      Row Name 01/25/25 1126       Plan    Plan Comments Mother: Anila Sepulveda, MRN: [id number]; infant: Lynn \"Deana\" Coco, MRN: [id number]. CSW consulted for \"Mom from [place] and is here with other children who are in their teens, FOB is in [place]. Not sure of her needs for this baby are.\" Of note, no toxicology screens were ordered for " "mother or infant as need was not warranted at this time. CSW met with mother at bedside while mother's oldest daughter and support person/sponsor were in the room. Mother gave consent for daughter & support person to be in the room during assessment. Mother verified address, phone number, and insurance. Mother reports [employer] has not spoken to her about adding infant to health insurance. Mother reports having no infant supplies. Support person shared, \"I called the hospital on Wednesday and was informed social work could give mom a car seat, bassinet, bottles, pacifiers, swaddles, etc.\" CSW explained, I'm unsure who told you I could provide all those items, but unfortunately, I can only provide a car seat, Pack N Play, and a few clothes. Support person stated, \"I was told social work could provide all infant supplies. Can you provide formula.\" CSW apologized and stated, mom will receive a discharge bag which will contain one to two days worth of formula, but unfortunately, CSW does not have formula. CSW added, she will provide a list of organizations in [place] who assist with infant supplies. Support person appeared frustrated but voiced understanding. (continue)                  Continued Care and Services - Admitted Since 1/23/2025    No active coordination exists for this encounter.          Demographic Summary       Row Name 01/25/25 1124       General Information    Admission Type inpatient    Arrived From home    Referral Source nursing    Reason for Consult other (see comments);psychosocial concerns;community resources    General Information Comments Mom from [facility] and is here with other children who are in their teens, FOB is in [place]. Not sure of her needs for this baby are                   Functional Status       Row Name 01/25/25 1122       Functional Status, IADL    Medications independent    Meal Preparation independent    Housekeeping independent    Laundry independent    Shopping independent "       Mental Status    General Appearance WDL WDL       Mental Status Summary    Recent Changes in Mental Status/Cognitive Functioning no changes                   Psychosocial       Row Name 01/25/25 1125       Behavior WDL    Behavior WDL WDL       Emotion Mood WDL    Emotion/Mood/Affect WDL WDL       Speech WDL    Speech WDL WDL       Perceptual State WDL    Perceptual State WDL WDL       Thought Process WDL    Thought Process WDL WDL       Intellectual Performance WDL    Intellectual Performance WDL WDL       Coping/Stress    Major Change/Loss/Stressor birth    Patient Personal Strengths future/goal oriented;motivated;positive attitude    Sources of Support other family members;friend(s);community support;[employer] organization;adult child(nikolai)                   Abuse/Neglect       Row Name 01/25/25 1126       Personal Safety    Physical Signs of Abuse Present no                   Legal    No documentation.                  Substance Abuse    No documentation.                  Patient Forms    No documentation.                     BRAD Caldera

## 2025-01-25 NOTE — PLAN OF CARE
Goal Outcome Evaluation:      VSS. Pain controlled with po meds. Fundus and lochia within normal limits. Ambulating independently. Voiding spontaneously. Breastfeeding. Bonding well with baby. No concerns at this time

## 2025-01-26 VITALS
TEMPERATURE: 97.6 F | SYSTOLIC BLOOD PRESSURE: 117 MMHG | DIASTOLIC BLOOD PRESSURE: 78 MMHG | RESPIRATION RATE: 16 BRPM | OXYGEN SATURATION: 99 % | WEIGHT: 182 LBS | HEIGHT: 65 IN | HEART RATE: 76 BPM | BODY MASS INDEX: 30.32 KG/M2

## 2025-01-26 LAB
ALBUMIN SERPL-MCNC: 2.5 G/DL (ref 3.5–5.2)
ALBUMIN/GLOB SERPL: 0.9 G/DL
ALP SERPL-CCNC: 128 U/L (ref 39–117)
ALT SERPL W P-5'-P-CCNC: 12 U/L (ref 1–33)
ANION GAP SERPL CALCULATED.3IONS-SCNC: 9.7 MMOL/L (ref 5–15)
AST SERPL-CCNC: 19 U/L (ref 1–32)
BILIRUB SERPL-MCNC: <0.2 MG/DL (ref 0–1.2)
BUN SERPL-MCNC: 10 MG/DL (ref 6–20)
BUN/CREAT SERPL: 20.4 (ref 7–25)
CALCIUM SPEC-SCNC: 8.2 MG/DL (ref 8.6–10.5)
CHLORIDE SERPL-SCNC: 104 MMOL/L (ref 98–107)
CO2 SERPL-SCNC: 21.3 MMOL/L (ref 22–29)
CREAT SERPL-MCNC: 0.49 MG/DL (ref 0.57–1)
CREAT UR-MCNC: 46.8 MG/DL
DEPRECATED RDW RBC AUTO: 39.5 FL (ref 37–54)
EGFRCR SERPLBLD CKD-EPI 2021: 126.2 ML/MIN/1.73
ERYTHROCYTE [DISTWIDTH] IN BLOOD BY AUTOMATED COUNT: 13.6 % (ref 12.3–15.4)
GLOBULIN UR ELPH-MCNC: 2.8 GM/DL
GLUCOSE BLDC GLUCOMTR-MCNC: 77 MG/DL (ref 70–130)
GLUCOSE SERPL-MCNC: 92 MG/DL (ref 65–99)
HCT VFR BLD AUTO: 31.3 % (ref 34–46.6)
HGB BLD-MCNC: 10.6 G/DL (ref 12–15.9)
MCH RBC QN AUTO: 27.8 PG (ref 26.6–33)
MCHC RBC AUTO-ENTMCNC: 33.9 G/DL (ref 31.5–35.7)
MCV RBC AUTO: 82.2 FL (ref 79–97)
PLATELET # BLD AUTO: 267 10*3/MM3 (ref 140–450)
PMV BLD AUTO: 12.1 FL (ref 6–12)
POTASSIUM SERPL-SCNC: 3.5 MMOL/L (ref 3.5–5.2)
PROT ?TM UR-MCNC: 35 MG/DL
PROT SERPL-MCNC: 5.3 G/DL (ref 6–8.5)
RBC # BLD AUTO: 3.81 10*6/MM3 (ref 3.77–5.28)
SODIUM SERPL-SCNC: 135 MMOL/L (ref 136–145)
WBC NRBC COR # BLD AUTO: 11.54 10*3/MM3 (ref 3.4–10.8)

## 2025-01-26 PROCEDURE — 85027 COMPLETE CBC AUTOMATED: CPT | Performed by: OBSTETRICS & GYNECOLOGY

## 2025-01-26 PROCEDURE — 80053 COMPREHEN METABOLIC PANEL: CPT | Performed by: INTERNAL MEDICINE

## 2025-01-26 PROCEDURE — 84156 ASSAY OF PROTEIN URINE: CPT | Performed by: INTERNAL MEDICINE

## 2025-01-26 PROCEDURE — 82948 REAGENT STRIP/BLOOD GLUCOSE: CPT

## 2025-01-26 PROCEDURE — 82570 ASSAY OF URINE CREATININE: CPT | Performed by: INTERNAL MEDICINE

## 2025-01-26 RX ORDER — IBUPROFEN 600 MG/1
600 TABLET, FILM COATED ORAL EVERY 6 HOURS PRN
Qty: 50 TABLET | Refills: 3 | Status: SHIPPED | OUTPATIENT
Start: 2025-01-26

## 2025-01-26 RX ORDER — SWAB
1 SWAB, NON-MEDICATED MISCELLANEOUS DAILY
Qty: 90 EACH | Refills: 3 | Status: SHIPPED | OUTPATIENT
Start: 2025-01-26

## 2025-01-26 RX ADMIN — Medication: at 13:28

## 2025-01-26 RX ADMIN — PRENATAL VITAMINS-IRON FUMARATE 27 MG IRON-FOLIC ACID 0.8 MG TABLET 1 TABLET: at 08:36

## 2025-01-26 RX ADMIN — ACETAMINOPHEN 650 MG: 325 TABLET, FILM COATED ORAL at 08:36

## 2025-01-26 RX ADMIN — IBUPROFEN 600 MG: 600 TABLET ORAL at 00:39

## 2025-01-26 RX ADMIN — DOCUSATE SODIUM 100 MG: 100 CAPSULE, LIQUID FILLED ORAL at 08:36

## 2025-01-26 NOTE — PLAN OF CARE
Goal Outcome Evaluation:         VSS. Pain controlled with po meds. Fundus and lochia within normal limits. Ambulating independently. Voiding spontaneously. Breastfeeding with bottle feeding supplementation. Bonding well with baby. No concerns at this time

## 2025-01-26 NOTE — DISCHARGE SUMMARY
Date of Discharge:  2025    Discharge Diagnosis: postpartum care immediately after delivery     Presenting Problem/History of Present Illness  Gestational diabetes mellitus (GDM) requiring insulin [O24.414]       Hospital Course  Patient is a 34 y.o. female  39w4d presented with scheduled medical induction for GMD at 39 weeks.  For events surrounding her delivery please see delivery/op note.  Her postpartum course was uneventful and today PPD#2, she is ready for discharge.  She meets all milestones and criteria for discharge and instructions were reviewed and she voiced understanding.     Procedures Performed     1431 Note By: Yossi Torres MD    Consults:   Consults       Date and Time Order Name Status Description    2025  2:23 PM Inpatient Nephrology Consult Completed             Pertinent Test Results: None     Condition on Discharge:  Stable     Discharge Disposition  Home or Self Care    Discharge Medications     Discharge Medications        New Medications        Instructions Start Date   ibuprofen 600 MG tablet  Commonly known as: ADVIL,MOTRIN   600 mg, Oral, Every 6 Hours PRN      Prenatal 28-0.8 MG tablet   1 tablet, Oral, Daily, Please use formulary or generic, with DHA ideal             Continue These Medications        Instructions Start Date   Blood Pressure Monitor Automat device   Use to check blood pressure twice daily      freestyle lancets   1 each, Other, 4 Times Daily      FreeStyle Lite w/Device kit              Stop These Medications      benzonatate 200 MG capsule  Commonly known as: TESSALON     glucose blood test strip     metFORMIN  MG 24 hr tablet  Commonly known as: GLUCOPHAGE-XR              Discharge Diet:   Diet Instructions       Diet: Regular/House Diet; Regular Texture (IDDSI 7); Thin (IDDSI 0)      Discharge Diet: Regular/House Diet    Texture: Regular Texture (IDDSI 7)    Fluid Consistency: Thin (IDDSI 0)            Activity at  Discharge:   Activity Instructions       Pelvic Rest              Follow-up Appointments  No future appointments.  Additional Instructions for the Follow-ups that You Need to Schedule       Discharge Follow-up with Specialty: Dr. Torres; 1 Week   As directed      Specialty: Dr. Torres   Follow Up: 1 Week   Follow Up Details: BP check                Test Results Pending at Discharge  Pending Labs       Order Current Status    Anti-PLA2R In process    Cryoglobulin In process    Immunofixation, Serum In process             Chino Back MD  01/26/25  11:40 EST

## 2025-01-26 NOTE — PROGRESS NOTES
Postpartum Progress Note      Status post Vaginal Delivery: Doing well postoperatively.     1) postpartum care immediately following delivery : doing well, routine care. Ready for discharge.   2) Gest DM, insulin controlled. Normal BS after delivery - follow up postpartum   3) Nephrotic syndrome - nephrology following, has outpatient appointment arranged  4) Hypertension, gestational - mild range with most normal blood pressure - follow up in office with Dr. Torres in one week.     Rh status: O positive  Syphilis screen delivery admit: NR  Rubella: Immune  Gender: Female     Subjective     Postpartum Day 2: Vaginal delivery    The patient feels well. The patient denies emotional concerns. Pain is well controlled with current medications. The baby is well. The patient is ambulating well. The patient is tolerating a normal diet.     Objective     Vital signs in last 24 hours:  Temp:  [97.5 °F (36.4 °C)-97.7 °F (36.5 °C)] 97.6 °F (36.4 °C)  Heart Rate:  [76-85] 76  Resp:  [16-18] 16  BP: (117-145)/(74-87) 117/78      Physical Exam  Constitutional:       General: She is not in acute distress.     Appearance: Normal appearance. She is normal weight.   Pulmonary:      Effort: Pulmonary effort is normal. No respiratory distress.   Abdominal:      General: There is no distension.      Tenderness: There is no abdominal tenderness.   Musculoskeletal:         General: No tenderness.      Right lower leg: Edema (Trace edema) present.      Left lower leg: Edema present.   Neurological:      General: No focal deficit present.      Mental Status: She is alert.      Deep Tendon Reflexes: Reflexes normal.   Vitals reviewed.          Lab Results   Component Value Date    WBC 11.54 (H) 01/26/2025    HGB 10.6 (L) 01/26/2025    HCT 31.3 (L) 01/26/2025    MCV 82.2 01/26/2025     01/26/2025       Chino Back MD  1/26/2025  11:33 EST

## 2025-01-26 NOTE — PLAN OF CARE
Goal Outcome Evaluation:  Plan of Care Reviewed With: patient        Progress: improving     Vss. Pain controlled w/prn pain meds. Mom clear for d/c. Instructions gone over w/mother & sponsor at bedside. Meds sent to preferred pharm & private transportation provided. Supplies given as well as car seat & etc. All questions answered at this time.

## 2025-01-27 ENCOUNTER — TELEPHONE (OUTPATIENT)
Dept: OBSTETRICS AND GYNECOLOGY | Facility: CLINIC | Age: 35
End: 2025-01-27
Payer: MEDICAID

## 2025-01-27 LAB
IGA SERPL-MCNC: 246 MG/DL (ref 87–352)
IGG SERPL-MCNC: 632 MG/DL (ref 586–1602)
IGM SERPL-MCNC: 97 MG/DL (ref 26–217)
PLA2R IGG SER IA-ACNC: <1.8 RU/ML (ref 0–19.9)
PROT PATTERN SERPL IFE-IMP: NORMAL

## 2025-01-27 NOTE — TELEPHONE ENCOUNTER
Caller: Anila Sepulveda    Relationship to patient: Self    Best call back number: [phone]    Chief complaint: ONE WEEK BP CHECK, NO APPT AVAILABLE     Type of visit: PP BP CHECK     Requested date: 1/31/25

## 2025-01-27 NOTE — TELEPHONE ENCOUNTER
Hub staff attempted to follow warm transfer process and was unsuccessful     Caller: Anila Sepulveda    Relationship to patient: Self    Best call back number: [phone] (home)     Patient is needing: TRYING TO FEED BABY. MILK DUCT SEEMS TO BE COGGED. VERY PAINFUL.

## 2025-01-27 NOTE — TELEPHONE ENCOUNTER
Is your breast warm to touch or reddened? You need to try to pump the milk out of the breast. You can  a warm shower and try to express the milk out and use a warm compress to help unclog the duct. Do you have a fever? Is your pharmacy [facility] at [address]?  My Chart message sent.

## 2025-01-30 ENCOUNTER — TELEPHONE (OUTPATIENT)
Dept: OBSTETRICS AND GYNECOLOGY | Facility: CLINIC | Age: 35
End: 2025-01-30

## 2025-01-30 ENCOUNTER — POSTPARTUM VISIT (OUTPATIENT)
Dept: OBSTETRICS AND GYNECOLOGY | Facility: CLINIC | Age: 35
End: 2025-01-30
Payer: MEDICAID

## 2025-01-30 VITALS
DIASTOLIC BLOOD PRESSURE: 90 MMHG | BODY MASS INDEX: 27.99 KG/M2 | HEIGHT: 65 IN | SYSTOLIC BLOOD PRESSURE: 132 MMHG | WEIGHT: 168 LBS | HEART RATE: 87 BPM

## 2025-01-30 DIAGNOSIS — N04.9 NEPHROTIC SYNDROME: ICD-10-CM

## 2025-01-30 DIAGNOSIS — G89.29 CHRONIC NONINTRACTABLE HEADACHE, UNSPECIFIED HEADACHE TYPE: ICD-10-CM

## 2025-01-30 DIAGNOSIS — Z30.09 BIRTH CONTROL COUNSELING: ICD-10-CM

## 2025-01-30 DIAGNOSIS — R51.9 CHRONIC NONINTRACTABLE HEADACHE, UNSPECIFIED HEADACHE TYPE: ICD-10-CM

## 2025-01-30 DIAGNOSIS — Z13.89 SCREENING FOR BLOOD OR PROTEIN IN URINE: Primary | ICD-10-CM

## 2025-01-30 LAB
CRYOGLOB SER QL 1D COLD INC: NORMAL
GLUCOSE UR STRIP-MCNC: NEGATIVE MG/DL
PROT UR STRIP-MCNC: ABNORMAL MG/DL

## 2025-01-30 RX ORDER — MAGNESIUM OXIDE 400 MG/1
400 TABLET ORAL DAILY
Qty: 30 TABLET | Refills: 0 | Status: SHIPPED | OUTPATIENT
Start: 2025-01-30

## 2025-01-30 NOTE — PROGRESS NOTES
"Chief Complaint  Postpartum Care (Pt here for postpartum blood pressure check.)    Subjective        Anila Sepulveda presents to [facility]  History of Present Illness  Patient is here for blood pressure check 1 week postpartum.  She had an uncomplicated vaginal delivery.  She did develop some mild range blood pressure elevations in the postpartum setting.  She did not require antihypertensive medication.  Patient had developed unexplained nephrotic syndrome in the third trimester.  She was seen by nephrology while in the hospital.  Workup so far has been largely unremarkable.  She has follow-up appointment with nephrology on 2/11.  Her proteinuria mostly normalized soon following delivery while in the hospital.    Patient does report occasional mild headache at home.  She tried taking some ibuprofen but it has not really helped.    Patient reports that she does feel some pain sometimes in the left side and the left leg, but no swelling.  Objective   Vital Signs:  /90   Pulse 87   Ht 165.1 cm (65\")   Wt 76.2 kg (168 lb)   BMI 27.96 kg/m²   Estimated body mass index is 27.96 kg/m² as calculated from the following:    Height as of this encounter: 165.1 cm (65\").    Weight as of this encounter: 76.2 kg (168 lb).          Physical Exam   General: No acute distress, awake and oriented x3  Psychiatric: good judgment and insight, normal mood  Neurological: cranial nerves II through XII intact, no deficits    Result Review :                Assessment and Plan   Diagnoses and all orders for this visit:    1. Screening for blood or protein in urine (Primary)  -     POC Urinalysis Dipstick    2. Encounter for postpartum visit    Patient appears to be doing well today.  Continue current care.  Plan next postpartum visit at 6 weeks.    3. Gestational hypertension without significant proteinuria, postpartum    Blood pressures are on the mild range today.  Do not feel that medication is necessary at " this time.  Advised patient to continue to monitor at home.    4. Gestational diabetes mellitus (GDM), postpartum  -     Gestational GTT 2 Hr ([facility]); Future  -     Glucose, Random; Future    Patient with history of gestational diabetes.  She will need to complete the 2-hour glucose challenge at her 6-week visit.  Instructions given to patient.    5. Nephrotic syndrome  -     Protein / Creatinine Ratio, Urine - Urine, Clean Catch    Patient with unexplained severe proteinuria this pregnancy.  It began at the start of the third trimester without any significant blood pressure elevations.  She had difficulty with follow-up with nephrology in the outpatient setting, but they did see her in the hospital.  Workup for her proteinuria/nephrotic syndrome is currently ongoing.  Still feel this is not likely related to preeclampsia.  She is encouraged to continue to follow-up with nephrology as scheduled.  I have given the patient a sheet of paper with the name of the nephrologist she is to see, the office location, and the date and time.    6. Chronic nonintractable headache, unspecified headache type  -     magnesium oxide (MAG-OX) 400 MG tablet; Take 1 tablet by mouth Daily.  Dispense: 30 tablet; Refill: 0    7. Birth control counseling  Various contraceptive options discussed including combined oral contraceptives, contraceptive patch, NuvaRing, progesterone only contraceptive, Depo-Provera, Nexplanon, progesterone IUD, copper IUD, and barrier methods. Patient elects copper intrauterine device. Risks, benefits, alternatives discussed including risks of bleeding, infection, subfertility, infertility, scarring, pelvic pain, irregular bleeding, dyspareunia, migration/perforation/expulsion of device, possible need for surgery for repair, unwanted/ectopic pregnancy, and need for surgical removal of device. All questions answered.  We will plan for insertion at the time of her 6-week postpartum visit.  We will attempt to  obtain prior authorization at this time.  Process discussed with the patient.       Follow Up   Return 6-week postpartum visit with ParaGard IUD insertion.  Patient was given instructions and counseling regarding her condition or for health maintenance advice. Please see specific information pulled into the AVS if appropriate.

## 2025-01-30 NOTE — TELEPHONE ENCOUNTER
Pt states she is on way. Per Dr. Torres can still be seen if close  Pt has been r/s if she does not make in time

## 2025-01-31 LAB
CREAT UR-MCNC: 69.9 MG/DL
PROT UR-MCNC: 403.2 MG/DL
PROT/CREAT UR: 5768 MG/G CREAT (ref 0–200)

## 2025-02-04 ENCOUNTER — MATERNAL SCREENING (OUTPATIENT)
Dept: CALL CENTER | Facility: HOSPITAL | Age: 35
End: 2025-02-04
Payer: MEDICAID

## 2025-02-04 NOTE — OUTREACH NOTE
Maternal Screening Survey      Flowsheet Row Responses   Facility patient discharged from? [facility]   Attempt successful? No   Unsuccessful attempts Attempt 2              Karla LAFLEUR - Registered Nurse

## 2025-02-04 NOTE — OUTREACH NOTE
Maternal Screening Survey      Flowsheet Row Responses   Facility patient discharged from? [facility]   Attempt successful? No   Unsuccessful attempts Attempt 1  [[employer], Pita Cash, #[id number], UTR pt]              Karla LAFLEUR - Registered Nurse

## 2025-02-05 ENCOUNTER — MATERNAL SCREENING (OUTPATIENT)
Dept: CALL CENTER | Facility: HOSPITAL | Age: 35
End: 2025-02-05
Payer: MEDICAID

## 2025-02-05 NOTE — OUTREACH NOTE
Maternal Screening Survey      Flowsheet Row Responses   Facility patient discharged from? [facility]   Attempt successful? No  [used [employer] for call, ID # [id number]]   Unsuccessful attempts Attempt 3   Revoke Unable to reach              Lupe Pop Registered Nurse

## 2025-02-14 ENCOUNTER — TELEPHONE (OUTPATIENT)
Dept: OBSTETRICS AND GYNECOLOGY | Facility: CLINIC | Age: 35
End: 2025-02-14
Payer: MEDICAID

## 2025-02-14 NOTE — TELEPHONE ENCOUNTER
[facility] is calling regarding pt PP appt.   Pt has been experiencing constant headaches, no relief with tylenol.   They are asking for pt to be seen sooner.    Would you like pt to be added on with you sooner? Pt appt is currently 3/13    Please advise, thanks!

## 2025-02-20 ENCOUNTER — POSTPARTUM VISIT (OUTPATIENT)
Dept: OBSTETRICS AND GYNECOLOGY | Facility: CLINIC | Age: 35
End: 2025-02-20
Payer: MEDICAID

## 2025-02-20 VITALS
DIASTOLIC BLOOD PRESSURE: 80 MMHG | SYSTOLIC BLOOD PRESSURE: 122 MMHG | BODY MASS INDEX: 25.49 KG/M2 | HEIGHT: 65 IN | WEIGHT: 153 LBS

## 2025-02-20 DIAGNOSIS — G44.59 OTHER COMPLICATED HEADACHE SYNDROME: ICD-10-CM

## 2025-02-20 LAB
BILIRUB BLD-MCNC: NEGATIVE MG/DL
GLUCOSE UR STRIP-MCNC: NEGATIVE MG/DL
KETONES UR QL: NEGATIVE
LEUKOCYTE EST, POC: ABNORMAL
NITRITE UR-MCNC: NEGATIVE MG/ML
PROT UR STRIP-MCNC: ABNORMAL MG/DL
RBC # UR STRIP: ABNORMAL /UL

## 2025-02-20 NOTE — PROGRESS NOTES
POSTPARTUM VISIT    Chief Complaint   Patient presents with    Postpartum Care     Here today for headaches         SUBJECTIVE     Anila Sepulveda is a 34 y.o.  who presents for a postpartum visit following a spontaneous vaginal delivery on 2025 by Dr. Back.   Her pregnancy was complicated by gestational diabetes. Her postpartum course has been remarkable for a headache since around time she returned home that is unrelieved by tylenol. She reports that she has had previous .    Lochia: appropriate  Preeclampsia S/S: Denies scotoma, or right upper quadrant abdominal pain. She does report a headache.  Bowel and bladder function: have returned to normal.   Mood changes: none   Infant feeding method: breast feeding  Incontinence: no  Dyspareunia: no    Past Medical History:   Diagnosis Date    Bilateral mastodynia 2018    Complete spontaneous  without complication 2024:  - LMP 10/28/2023  - ED visit 2024 with bleeding for 2 days. BSUS showed GS, no definitive YS. Thickened endometrium.   - Quant 1204 on 24  - Patient report that since her ED visit, she has passed tissue. Showed pics during clinic visit today.  - Bleeding is now minimal with light cramping.    - PNVs sent to pharmacy since pt wishes to get pregnant again. Advised to wait for one m    COVID-19 2024    Irregular uterine bleeding     Spontaneous miscarriage 2024       Past Surgical History:   Procedure Laterality Date    D & C HYSTEROSCOPY N/A 3/28/2024    Procedure: DILATATION AND CURETTAGE HYSTEROSCOPY WITH MYOSURE RESECTION OF UTERINE MASS;  Surgeon: Yossi Torres MD;  Location: [facility] MAIN OR;  Service: Obstetrics/Gynecology;  Laterality: N/A;    HYSTEROSCOPY         Social History     Tobacco Use    Smoking status: Never     Passive exposure: Never    Smokeless tobacco: Never   Vaping Use    Vaping status: Never Used   Substance Use Topics    Alcohol use: Never    Drug use: Never  "      Family History   Problem Relation Age of Onset    Breast cancer Neg Hx     Ovarian cancer Neg Hx     Uterine cancer Neg Hx     Colon cancer Neg Hx     Malig Hyperthermia Neg Hx        Patient Active Problem List   Diagnosis    Antepartum multigravida of advanced maternal age    Proteinuria affecting pregnancy in third trimester    Gestational diabetes mellitus (GDM) in third trimester controlled on oral hypoglycemic drug    Abnormal fetal ultrasound    Gestational diabetes mellitus (GDM) requiring insulin    Normal vaginal delivery        Review of Systems   Eyes:  Negative for photophobia and visual disturbance.   Gastrointestinal:  Negative for abdominal pain.   Neurological:  Positive for headaches (unrelieved by tylenol).   All other systems reviewed and are negative.      OBJECTIVE     /80   Ht 165.1 cm (65\")   Wt 69.4 kg (153 lb)   BMI 25.46 kg/m²      Physical Exam  Constitutional:       General: She is awake.      Appearance: Normal appearance. She is well-developed and well-groomed.   HENT:      Head: Normocephalic and atraumatic.   Pulmonary:      Effort: Pulmonary effort is normal.   Musculoskeletal:      Cervical back: Normal range of motion.   Neurological:      General: No focal deficit present.      Mental Status: She is alert and oriented to person, place, and time.      Deep Tendon Reflexes: Reflexes normal.   Skin:     General: Skin is warm and dry.   Psychiatric:         Mood and Affect: Mood normal.         Behavior: Behavior normal. Behavior is cooperative.   Vitals reviewed.         Lab Results   Component Value Date    WBC 11.54 (H) 01/26/2025    HGB 10.6 (L) 01/26/2025    HCT 31.3 (L) 01/26/2025     01/26/2025        ASSESSMENT    Diagnoses and all orders for this visit:    1. Postpartum care and examination (Primary)  -     POC Urinalysis Dipstick  -     Protein / Creatinine Ratio, Urine - Urine, Clean Catch    2. Other complicated headache syndrome  -     POC " Urinalysis Dipstick  -     Protein / Creatinine Ratio, Urine - Urine, Clean Catch        PLAN   POSTPARTUM EXAM: Reports no foul odor or drainage from vagina, lochia WNL, breasts without erythema or pain. Continue pelvic rest and lifting precautions.  MOTHER/BABY DYAD: doing well.  CONTRACEPTION: n/a  INFANT FEEDING: Breastfeeding, doing well.  PREECLAMPSIA PRECAUTIONS: warning signs and symptoms reviewed, given her 3+ proteinuria she will return to the EDMAR for further labwork     Return in about 4 weeks (around 3/20/2025) for postpartum visit.    Re Rivas CNM  2/20/2025  13:48 EST       used via [facility], language: Romansh

## 2025-02-21 LAB
CREAT UR-MCNC: 99.4 MG/DL
PROT UR-MCNC: 135 MG/DL
PROT/CREAT UR: 1358.1 MG/G CREA (ref 0–200)

## 2025-03-03 ENCOUNTER — TELEPHONE (OUTPATIENT)
Dept: OBSTETRICS AND GYNECOLOGY | Facility: CLINIC | Age: 35
End: 2025-03-03
Payer: MEDICAID

## 2025-03-03 NOTE — TELEPHONE ENCOUNTER
Dr. Torres pt    Recently seen 2/20 for PP concerns. Has upcoming appt 3/13  Pt calling worried states bleeding stopped for a week, returned yesterday, and today is spotting. Was taking magnesium oxide that helped for a bit, but not providing relief anymore for constant headaches. Pt also states she has loss of appetite and bad back pain.    Should pt monitor symptoms, head to ED, or move appt up?     Please advise, thank you   Subjective   Patient ID: Jono Flowers is a 29 y.o. female who presents for Establish Care, discuss meds, and Heartburn.  HPI  Jono is here to establish care.    Hx of breast lumps occasionally, has had US in the past, lumps have been benign. Interested in discussing with a surgeon for possible breast reduction.    Anxiety has been elevated. Sleep initiation has been difficult, has tried multiple different agents in the past without significant relief.    Working to increase physical activity.     PMHx: anxiety/depression  SurgHx: facial/neck surgery  FamHx: cousin - breast CA (29), HTN, CVA, CAD - older family members  SocialHx: Never smoker. Never vaped. Social EtOH 1-2/week. No drug use. Lives with partner. Has two teenage children (18, 15).     Review of Systems  12-point ROS was reviewed and is negative, unless otherwise noted in HPI    Objective   Vitals:    09/23/24 1507   BP: 100/80      Physical Exam  GEN: alert, conversant, NAD  HEENT: PERRL, EOMI, MMM, Tms pearly gray bilaterally  NECK: supple, no LAD appreciated  CHEST: CTAB  CV: S1, S2, RRR, no murmurs appreciated  ABD: soft, NT, ND  EXT: no significant LE edema  SKIN: warm, dry    Assessment/Plan   #Depression/anxiety  #insomnia  For insomnia: Has trialed melatonin without relief, has trialed nortriptyline without relief, has trialed diphenhydramine/ hydroxyzine without relief.  - Established with therapist/counselor  - resume buspar 5mg BID  - Continue wellbutrin 300mg daily  - start belsomra 5mg nightly  - UDS/CSA (9/23/24)    #GERD  - avoid trigger foods  - continue pepcid 10mg nightly    #breast lumps  Has bound breasts for the last ~10 years  - bilateral breast US ordered  - referral to Dr. Sales, [facility] for possible breast reduction    Health Maintenance:  Vaccines: COVID (will update), Flu (will update), TDAP (2022)  Screening: Discuss paptest at next visit.  Labs: UDS today     RTC in 3 months, or sooner PRN    Ankur Wright,

## (undated) DEVICE — DRAPE,UNDERBUTTOCKS,PCH,STERILE: Brand: MEDLINE

## (undated) DEVICE — STRAP STIRUP WO/ RNG

## (undated) DEVICE — KT PROC HYSTSCPY TB ST

## (undated) DEVICE — THE STERILE LIGHT HANDLE COVER IS USED WITH STERIS SURGICAL LIGHTING AND VISUALIZATION SYSTEMS.

## (undated) DEVICE — LOU D & C HYSTEROSCOPY: Brand: MEDLINE INDUSTRIES, INC.

## (undated) DEVICE — SEAL HYSTERSCOPE/OUTFLOW CHANNEL MYOSURE

## (undated) DEVICE — TOWEL,OR,DSP,ST,BLUE,STD,4/PK,20PK/CS: Brand: MEDLINE

## (undated) DEVICE — GOWN,NON-REINFORCED,SIRUS,SET IN SLV,XL: Brand: MEDLINE

## (undated) DEVICE — GLV SURG SENSICARE PI MIC PF SZ6.5 LF STRL

## (undated) DEVICE — GLV SURG SENSICARE PI PF LF 7 GRN STRL

## (undated) DEVICE — DEV TISS REMOV MYOSURE REACH

## (undated) DEVICE — SOL IRR NACL 0.9PCT 3000ML

## (undated) DEVICE — 4-PORT MANIFOLD: Brand: NEPTUNE 2

## (undated) DEVICE — COVER,MAYO STAND,STERILE: Brand: MEDLINE

## (undated) DEVICE — HDRST POSTN SLOTTED A/

## (undated) DEVICE — GLV SURG BIOGEL LTX PF 7

## (undated) DEVICE — GLV SURG SENSICARE POLYISPRN W/ALOE PF LF 6.5 GRN STRL

## (undated) DEVICE — GLV SURG SENSICARE PI MIC PF SZ7 LF STRL

## (undated) DEVICE — DRAPE,LITHOTOMY,ATTACHED,STERILE: Brand: MEDLINE

## (undated) DEVICE — ERASECAUTI INTERMIT TRAY: Brand: MEDLINE INDUSTRIES, INC.